# Patient Record
Sex: MALE | Race: WHITE | Employment: OTHER | URBAN - METROPOLITAN AREA
[De-identification: names, ages, dates, MRNs, and addresses within clinical notes are randomized per-mention and may not be internally consistent; named-entity substitution may affect disease eponyms.]

---

## 2020-05-21 ENCOUNTER — HOSPITAL ENCOUNTER (OUTPATIENT)
Dept: PHYSICAL THERAPY | Age: 75
Setting detail: THERAPIES SERIES
Discharge: HOME | End: 2020-05-21
Payer: MEDICARE

## 2020-05-21 PROCEDURE — 97161 PT EVAL LOW COMPLEX 20 MIN: CPT

## 2020-05-21 PROCEDURE — 97140 MANUAL THERAPY 1/> REGIONS: CPT

## 2020-05-21 PROCEDURE — 97110 THERAPEUTIC EXERCISES: CPT

## 2020-05-21 PROCEDURE — 97016 VASOPNEUMATIC DEVICE THERAPY: CPT

## 2020-05-21 ASSESSMENT — PAIN DESCRIPTION - DIRECTION: RADIATING_TOWARDS: UPPER ARM

## 2020-05-21 ASSESSMENT — PAIN SCALES - GENERAL: PAINLEVEL_OUTOF10: 1

## 2020-05-21 ASSESSMENT — PAIN DESCRIPTION - ORIENTATION: ORIENTATION: RIGHT

## 2020-05-21 ASSESSMENT — PAIN DESCRIPTION - PROGRESSION: CLINICAL_PROGRESSION: NOT CHANGED

## 2020-05-21 ASSESSMENT — PAIN DESCRIPTION - LOCATION: LOCATION: ARM

## 2020-05-21 ASSESSMENT — PAIN DESCRIPTION - ONSET: ONSET: PROGRESSIVE

## 2020-05-21 ASSESSMENT — PAIN - FUNCTIONAL ASSESSMENT: PAIN_FUNCTIONAL_ASSESSMENT: PREVENTS OR INTERFERES WITH MANY ACTIVE NOT PASSIVE ACTIVITIES

## 2020-05-21 ASSESSMENT — PAIN DESCRIPTION - PAIN TYPE: TYPE: ACUTE PAIN

## 2020-05-21 ASSESSMENT — PAIN DESCRIPTION - FREQUENCY: FREQUENCY: INTERMITTENT

## 2020-05-21 ASSESSMENT — PAIN DESCRIPTION - DESCRIPTORS: DESCRIPTORS: ACHING;SHARP;DULL

## 2020-05-21 NOTE — FLOWSHEET NOTE
pain.        Plan for Next Session:  Continue joint mobs and advance ROM to karen, pt not much for home exercise so keep HEP min to add important ones. ....       Time In / Time Out:    1415/1515       Timed Code/Total Treatment Minutes:  25/60  1 Man 15', 1 TE 10'  1 Vaso 10', 1 Eval 25'      Next Progress Note due:  vsiit 10 or dc      Plan of Care Interventions:  [x] Therapeutic Exercise  [x] Modalities:  [x] Therapeutic Activity     [] Ultrasound  [] Estim  [] Gait Training      [] Cervical Traction [] Lumbar Traction  [x] Neuromuscular Re-education    [x] Cold/hotpack [] Iontophoresis   [x] Instruction in HEP      [x] Vasopneumatic   [] Dry Needling    [x] Manual Therapy               [] Aquatic Therapy              Electronically signed by:  Bell Currie, PT, MPT, ATC  5/21/2020, 3:23 PM    5/21/2020, 3:26 PM

## 2020-05-28 ENCOUNTER — HOSPITAL ENCOUNTER (OUTPATIENT)
Dept: PHYSICAL THERAPY | Age: 75
Setting detail: THERAPIES SERIES
Discharge: HOME | End: 2020-05-28
Payer: MEDICARE

## 2020-05-28 PROCEDURE — 97016 VASOPNEUMATIC DEVICE THERAPY: CPT

## 2020-05-28 PROCEDURE — 97110 THERAPEUTIC EXERCISES: CPT

## 2020-05-28 PROCEDURE — 97140 MANUAL THERAPY 1/> REGIONS: CPT

## 2020-05-28 NOTE — FLOWSHEET NOTE
Outpatient Physical Therapy  [place]           [x] Phone: [phone]   Fax: [phone]  [place]           [] Phone: [phone]   Fax: [phone]        Physical Therapy Daily Treatment Note  Date:  2020    Patient Name:  Srinivasan Miranda    :  1945  MRN: [id number]  Restrictions/Precautions: Other position/activity restrictions: none  Diagnosis:   Diagnosis: R shoulder bursitis  Date of Injury/Surgery:   Treatment Diagnosis: Treatment Diagnosis: R shoulder pain, weakness, inflammation, stiffness    Insurance/Certification information: PT Insurance Information: Jessica Choi, [address] [facility]   Referring Physician:  Referring Practitioner: Dr Michelle Tabares   Next Doctor Visit:    Plan of care signed (Y/N):  pending  Outcome Measure: DASH 7%  Visit# / total visits: -  Pain level: 0/10 at rest, 6/10 with movement  Goals:       Short term goals  Time Frame for Short term goals: 3 weeks  Short term goal 1: Pt will be able to advance ROM work w/o pain  Short term goal 2: Pt will be able to raise arm on own > 120 w/ min to no pain  Short term goal 3: Pt will compliant w/ HEP  Long term goals  Time Frame for Long term goals : 6 weeks  Long term goal 1: Pt will be able to manage his symptoms on his own after PT  Long term goal 2: Pt will be able to return to prior activity w/o R shoulder pain  Long term goal 3: Pt will have full AROM and strength R UE w/o pain  Long term goal 4: Pt will decrease DASH score to <5% disability    Summary of Evaluation: Assessment: Pt is a 71 yo male who presents w/ R shoulder pain, bursitis, tendonosis. He has no prior Hx of shoulder pain and was using shoulder/arm w/o pain prior to onset 4 months ago. No know DAWN. He demonstrates limited AROM and painful; PROM, weakness and limited joint mobility in R shoulder. These limiations are affecting his ability to perform his usual daily actiivity and he will benefit from PT to help restore PLOF w/o pain.       Subjective:  Pt after manual therapy and stretches. Pt noted mild soreness with 1/10 pain at end of session and almost had full AROM of flexion after game ready. Pt would continue to benefit from skilled therapy interventions to address remaining impairments, improve mobility and strength and progress toward goal completion while reducing risk for re-injury or further decline. Plan for Next Session:  Continue joint mobs and advance ROM to karen, pt not much for home exercise so keep HEP min to add important ones. ....     Time In / Time Out:   7441-5044    Timed Code/Total Treatment Minutes:  53',  1 Man 19', 2 TE 24'  1 Vaso 10',     Next Progress Note due:  vsiit 10 or dc    Plan of Care Interventions:  [x] Therapeutic Exercise  [x] Modalities:  [x] Therapeutic Activity     [] Ultrasound  [] Estim  [] Gait Training      [] Cervical Traction [] Lumbar Traction  [x] Neuromuscular Re-education    [x] Cold/hotpack [] Iontophoresis   [x] Instruction in HEP      [x] Vasopneumatic   [] Dry Needling    [x] Manual Therapy               [] Aquatic Therapy              Electronically signed by:  Audrey Priest PT, DPT  5/28/2020, 8:28 AM    5/28/2020, 8:28 AM

## 2020-06-01 ENCOUNTER — HOSPITAL ENCOUNTER (OUTPATIENT)
Dept: PHYSICAL THERAPY | Age: 75
Setting detail: THERAPIES SERIES
Discharge: HOME | End: 2020-06-01
Payer: MEDICARE

## 2020-06-01 PROCEDURE — 97140 MANUAL THERAPY 1/> REGIONS: CPT

## 2020-06-01 PROCEDURE — 97016 VASOPNEUMATIC DEVICE THERAPY: CPT

## 2020-06-01 PROCEDURE — 97110 THERAPEUTIC EXERCISES: CPT

## 2020-06-01 NOTE — FLOWSHEET NOTE
Outpatient Physical Therapy  [place]           [x] Phone: [phone]   Fax: [phone]  [place]           [] Phone: [phone]   Fax: [phone]        Physical Therapy Daily Treatment Note  Date:  2020    Patient Name:  Kian Urban    :  1945  MRN: [id number]  Restrictions/Precautions: Other position/activity restrictions: none  Diagnosis:   Diagnosis: R shoulder bursitis  Date of Injury/Surgery:   Treatment Diagnosis: Treatment Diagnosis: R shoulder pain, weakness, inflammation, stiffness    Insurance/Certification information: PT Insurance Information: Heike Kidd, [address] [place] [address]   Referring Physician:  Referring Practitioner: Dr Carlie Lanier   Next Doctor Visit:    Plan of care signed (Y/N):  pending  Outcome Measure: DASH 7%  Visit# / total visits: 3/8-  Pain level: 0/10 at rest, 6/10 with movement  Goals:       Short term goals  Time Frame for Short term goals: 3 weeks  Short term goal 1: Pt will be able to advance ROM work w/o pain  Partially met  Short term goal 2: Pt will be able to raise arm on own > 120 w/ min to no pain  Some progress   Short term goal 3: Pt will compliant w/ HEP  Ok so far  Long term goals  Time Frame for Long term goals : 6 weeks  Long term goal 1: Pt will be able to manage his symptoms on his own after PT  Long term goal 2: Pt will be able to return to prior activity w/o R shoulder pain  Long term goal 3: Pt will have full AROM and strength R UE w/o pain  Long term goal 4: Pt will decrease DASH score to <5% disability    Summary of Evaluation: Assessment: Pt is a 69 yo male who presents w/ R shoulder pain, bursitis, tendonosis. He has no prior Hx of shoulder pain and was using shoulder/arm w/o pain prior to onset 4 months ago. No know DAWN. He demonstrates limited AROM and painful; PROM, weakness and limited joint mobility in R shoulder.   These limiations are affecting his ability to perform his usual daily actiivity and he will benefit from PT to help restore PLOF w/o pain. Subjective:  Pt reports that he feels the shoulder/arm is getting better. He can raise it better now, but still painful to do and can still hurt at night also or just resting at times. He even had a little pain L shoulder recently. Any changes in Ambulatory Summary Sheet? None    Objective:    Prior to today's treatment session, patient was screened for signs and symptoms related to COVID-19 including but not limited to verbally answering questions related to feeling ill, cough, or SOB, along with taking temperature via forehead thermometer. Patient presented with all negative signs and symptoms and had no fever >100 degrees Fahrenheit this date. Pt having more pain w/ abduction, even scaption today, had to help keep scap depressed to get any scaption. IR/ER mostly full in scap plane PROM w/ arm on towel. Pt has UT compensations w/ elevation and has a hard time correcting.    AROM R shoulder flex after manual work:  105°, but still painful       Exercises: (No more than 4 columns)   Exercise/Equipment 5/21/20  #1 5/28/20 #2 6/1/20  #3           WARM UP --   rpm, F/B 2' ea       pulley  Flex 2 x 10  abd 2 x 10 Flex 2 x 10  abd 2 x 10   MANUAL:  As below x See below x         TABLE      Self inferior mob 30\"x2  -   Cane press up  -- 10x2   Cane flexion  2 x 10 10x2   ABC's supine, shoulder 90  - 1x   scap retractions  2 x 10 10x2   Seated abd on table  2 x 10 10x2, below 90 today                  STANDING      pendulum 5# 1'  -   Taffy pull  GTB 10x   GTB 10x2   Standing cane flexion  x10 --   doorway pec stretch  2 x 30\"    Wall slide    Doorway, L foot FW, 10x    B ext w/ scap squeeze    GTB 10x2   Ball on wall, scap stab slide   10x2                     PROPRIOCEPTION                                    MODALITIES      Vaso  10' 10' 15'             Other Therapeutic Activities/Education:  Pathology and anatomy of shoulder joint and how it relates to condition, Hep

## 2020-06-03 ENCOUNTER — HOSPITAL ENCOUNTER (OUTPATIENT)
Dept: PHYSICAL THERAPY | Age: 75
Setting detail: THERAPIES SERIES
Discharge: HOME | End: 2020-06-03
Payer: MEDICARE

## 2020-06-03 PROCEDURE — 97110 THERAPEUTIC EXERCISES: CPT

## 2020-06-03 PROCEDURE — 97016 VASOPNEUMATIC DEVICE THERAPY: CPT

## 2020-06-03 PROCEDURE — 97140 MANUAL THERAPY 1/> REGIONS: CPT

## 2020-06-03 NOTE — FLOWSHEET NOTE
importance    Home Exercise Program:  Issued, practiced and pt demo ability to perform 5/21/2020.  6/3/20: updated HEP    Manual Treatments:  Joint mobs to [facility] joint II-III posterior and inferior, AC mobs, PROM to karen R shoulder all directions    Modalities:  Vaso lo pressure to R shoulder, 15'     Communication with other providers:  POC sent 5/21/20    Assessment: Pt tolerated today's treatment without any adverse reactions or complications this date. Pt is making nice improvements w/ AROM and some pain reduction also. Pt continues w/ limited and painful shoulder movements w/ compensatory patterns. Pt would continue to benefit from skilled therapy interventions to address remaining impairments, improve mobility and strength and progress toward goal completion while reducing risk for re-injury or further decline. Pain after Rx mostly unchanged. Plan for Next Session:  Continue joint mobs and advance ROM to karen, pt not much for home exercise so keep HEP min to add important ones. ....     Time In / Time Out:   1300/1400    Timed Code/Total Treatment Minutes:  45/60     1 Man 20', 2 TE 25'  1 Vaso 15',     Next Progress Note due:  vsiit 10 or dc    Plan of Care Interventions:  [x] Therapeutic Exercise  [x] Modalities:  [x] Therapeutic Activity     [] Ultrasound  [] Estim  [] Gait Training      [] Cervical Traction [] Lumbar Traction  [x] Neuromuscular Re-education    [x] Cold/hotpack [] Iontophoresis   [x] Instruction in HEP      [x] Vasopneumatic   [] Dry Needling    [x] Manual Therapy               [] Aquatic Therapy              Electronically signed by:  Celena Primrose, PT, MPT, ATC 6/3/2020, 10:50 AM       6/3/2020, 1:48 PM

## 2020-06-08 ENCOUNTER — HOSPITAL ENCOUNTER (OUTPATIENT)
Dept: PHYSICAL THERAPY | Age: 75
Setting detail: THERAPIES SERIES
Discharge: HOME | End: 2020-06-08
Payer: MEDICARE

## 2020-06-08 PROCEDURE — 97016 VASOPNEUMATIC DEVICE THERAPY: CPT

## 2020-06-08 PROCEDURE — 97140 MANUAL THERAPY 1/> REGIONS: CPT

## 2020-06-08 PROCEDURE — 97110 THERAPEUTIC EXERCISES: CPT

## 2020-06-08 NOTE — FLOWSHEET NOTE
will benefit from PT to help restore PLOF w/o pain. Did have Cortizone Feb/March      Subjective:   Starting to have some soreness on L now, R shoulder is a little more stiff than last Rx and pain about the same maybe back up just a little bi, but just today, was ok over weekend. Any changes in Ambulatory Summary Sheet? None    Objective:    Prior to today's treatment session, patient was screened for signs and symptoms related to COVID-19 including but not limited to verbally answering questions related to feeling ill, cough, or SOB, along with taking temperature via forehead thermometer. Patient presented with all negative signs and symptoms and had no fever >100 degrees Fahrenheit this date. 6/1: AROM R shoulder flex after manual work: 105°, but still painful   PROM ER full w/ arm propped on towel no pain. Painful flex in early arc of movement so did above painful arc and ROM improved w/ reps. Pain at 90 w/ prone ext. Flex AROM is improving w/ less painful arc.         Exercises: (No more than 4 columns)   Exercise/Equipment 6/1/20  #3 6/3/20  #4 6/8/20  #5             WARM UP  rpm, F/B 2' ea   rpm, F/B 2' ea  rpm, F/B 2' ea       pulley Flex 2 x 10  abd 2 x 10 Flex 2 x 10  abd 2 x 10 Flex 2 x 10  abd 2 x 10    MANUAL:  As below x x x           TABLE       Self inferior mob - -     Cane press up 10x2 10x2 1# 10x2     Cane flexion 10x2 10x2 AROM no cane, apinfree ROM 10x2    ABC's supine, shoulder 90 1x 1x 1# 1x    S/L ER   -            scap retractions 10x2 20x -    Seated abd on table 10x2, below 90 today Stand ball roll to 90 10x2 -    Prone ext  row  -  - 10x2   -              STANDING       pendulum -      Taffy pull  GTB 10x2 GTB 10x2 GTB 10x2    Standing cane flexion -- - -    doorway pec stretch  - -    Wall slide flex  Doorway, L foot FW, 10x  Incline table 10x Incline table 10xea flex and scapt, ABC 1x also    B ext w/ scap squeeze  GTB 10x2 GTB 10x2 GTB 10x2

## 2020-06-11 ENCOUNTER — HOSPITAL ENCOUNTER (OUTPATIENT)
Dept: PHYSICAL THERAPY | Age: 75
Setting detail: THERAPIES SERIES
Discharge: HOME | End: 2020-06-11
Payer: MEDICARE

## 2020-06-11 PROCEDURE — 97016 VASOPNEUMATIC DEVICE THERAPY: CPT

## 2020-06-11 PROCEDURE — 97140 MANUAL THERAPY 1/> REGIONS: CPT

## 2020-06-11 PROCEDURE — 97110 THERAPEUTIC EXERCISES: CPT

## 2020-06-11 NOTE — FLOWSHEET NOTE
GTB 10x2 GTB 10x2   Wall saw  10x2  10x2 10x2 20x   Seated eccentric lower scaption  10x 10x    TRX flex walk outs    10x 10x2    Wall push up    10x2 10x2   Ball on wall 4 way    20xea               PROPRIOCEPTION                                          MODALITIES       Vaso  15' 15' 15' 15'              Other Therapeutic Activities/Education:    Home Exercise Program:  Issued, practiced and pt demo ability to perform 5/21/2020.  6/3/20: updated HEP    Manual Treatments:  Scap mobs, Joint mobs to [facility] joint II-III posterior and inferior, AC mobs, PROM to karen R shoulder all directions    Modalities:  Vaso lo pressure to R shoulder, 15'     Communication with other providers:  POC sent 5/21/20    Assessment: Pt tolerated today's treatment without any adverse reactions or complications this date. Pt is making nice improvements w/ AROM and some pain reduction also. Pt continues w/ limited and painful shoulder movements w/ compensatory patterns. Pt would continue to benefit from skilled therapy interventions to address remaining impairments, improve mobility and strength and progress toward goal completion while reducing risk for re-injury or further decline. Pain after Rx mostly unchanged. Plan for Next Session:  Continue joint mobs and advance ROM to karen, pt not much for home exercise so keep HEP min to add important ones. ....     Time In / Time Out:  1305/1405       Timed Code/Total Treatment Minutes:   45/60    1 Man 20', 2 TE 25'  1 Vaso 15',     Next Progress Note due:  vsiit 10 or dc    Plan of Care Interventions:  [x] Therapeutic Exercise  [x] Modalities:  [x] Therapeutic Activity     [] Ultrasound  [] Estim  [] Gait Training      [] Cervical Traction [] Lumbar Traction  [x] Neuromuscular Re-education    [x] Cold/hotpack [] Iontophoresis   [x] Instruction in HEP      [x] Vasopneumatic   [] Dry Needling    [x] Manual Therapy               [] Aquatic Therapy              Electronically signed by:  Lexi Howard

## 2020-06-16 ENCOUNTER — HOSPITAL ENCOUNTER (OUTPATIENT)
Dept: PHYSICAL THERAPY | Age: 75
Setting detail: THERAPIES SERIES
Discharge: HOME | End: 2020-06-16
Payer: MEDICARE

## 2020-06-16 PROCEDURE — 97110 THERAPEUTIC EXERCISES: CPT

## 2020-06-16 PROCEDURE — 97140 MANUAL THERAPY 1/> REGIONS: CPT

## 2020-06-16 PROCEDURE — 97016 VASOPNEUMATIC DEVICE THERAPY: CPT

## 2020-06-18 ENCOUNTER — HOSPITAL ENCOUNTER (OUTPATIENT)
Dept: PHYSICAL THERAPY | Age: 75
Setting detail: THERAPIES SERIES
Discharge: HOME | End: 2020-06-18
Payer: MEDICARE

## 2020-06-18 PROCEDURE — 97016 VASOPNEUMATIC DEVICE THERAPY: CPT

## 2020-06-18 PROCEDURE — 97112 NEUROMUSCULAR REEDUCATION: CPT

## 2020-06-18 PROCEDURE — 97140 MANUAL THERAPY 1/> REGIONS: CPT

## 2020-06-18 PROCEDURE — 97110 THERAPEUTIC EXERCISES: CPT

## 2020-06-18 NOTE — FLOWSHEET NOTE
Treatments:  Scap mobs, Joint mobs to [facility] joint II-III posterior and inferior, , PROM to karen R shoulder all directions,     Modalities:  Vaso lo pressure to R shoulder, 15'     Communication with other providers:  POC sent 5/21/20    Assessment: Hazel Schafer demonstrated improvements in passive ER this date with little to no pain at end range, even without performing subscap release this date. He is making progress with reports of decreased pain and improved movement during functional tasks. He is tolerating exercises well in the clinic without increased pain. Abduction/Scaption ROM continues to be quite limited and painful. He will continue to benefit from skilled PT services in order to progress strengthening and neuromuscular re-education of shoulder girdle as well as to improve mobility of the shoulder complex. End session pain: 1/10    Plan for Next Session:  SCHEDULE?? Continue joint mobs and advance ROM to karen, pt not much for home exercise so keep HEP min to add important ones. ....     Time In / Time Out:    1350/1453    Timed Code/Total Treatment Minutes:   48'/63' 1 vaso 13' 1 man 13' 1  NR 15' 1 TE 18'     Next Progress Note due:  vsiit 10 or dc    Plan of Care Interventions:  [x] Therapeutic Exercise  [x] Modalities:  [x] Therapeutic Activity     [] Ultrasound  [] Estim  [] Gait Training      [] Cervical Traction [] Lumbar Traction  [x] Neuromuscular Re-education    [x] Cold/hotpack [] Iontophoresis   [x] Instruction in HEP      [x] Vasopneumatic   [] Dry Needling    [x] Manual Therapy               [] Aquatic Therapy              Electronically signed by:  Nadia Sanford PTA       6/18/2020, 8:19 AM

## 2020-06-23 ENCOUNTER — HOSPITAL ENCOUNTER (OUTPATIENT)
Dept: PHYSICAL THERAPY | Age: 75
Setting detail: THERAPIES SERIES
Discharge: HOME | End: 2020-06-23
Payer: MEDICARE

## 2020-06-23 PROCEDURE — 97112 NEUROMUSCULAR REEDUCATION: CPT

## 2020-06-23 PROCEDURE — 97140 MANUAL THERAPY 1/> REGIONS: CPT

## 2020-06-23 PROCEDURE — 97016 VASOPNEUMATIC DEVICE THERAPY: CPT

## 2020-06-23 PROCEDURE — 97110 THERAPEUTIC EXERCISES: CPT

## 2020-06-23 NOTE — FLOWSHEET NOTE
appropriate desk set up and good posture while sitting. Home Exercise Program:  Issued, practiced and pt demo ability to perform 5/21/2020.  6/3/20: updated HEP    Manual Treatments:  Scap mobs, Joint mobs to [facility] joint II-III posterior and inferior, , PROM to karen R shoulder all directions,     Modalities:  Vaso lo pressure to R shoulder, 15'     Communication with other providers:  POC sent 5/21/20    Assessment:  Anita Tovar tolerated today's session well. He made significant gains in abduction/scaption ROM this date without increased pain. ER is full at 45° of abduction; however, an increase of just 10° of abduction limits ER moderately secondary to pinch in the shoulder. He is able to raise his arm through more ROM during elevation; however, there is still compensatory patterns involved. He will continue to benefit from skilled PT services in order to improve joint and soft tissue mobility and restore strength and proper muscle firing patterns for improved AROM of the shoulder without pain. End session pain: 1-2/10    Plan for Next Session:    Continue joint mobs and advance ROM to karen, pt not much for home exercise so keep HEP min to add important ones. ....     Time In / Time Out:    1430/1529    Timed Code/Total Treatment Minutes:   44'/59' 1 vaso 13' 1 man 15' 1 NR 15' 1 TE 14'     Next Progress Note due:  vsiit 10 or dc    Plan of Care Interventions:  [x] Therapeutic Exercise  [x] Modalities:  [x] Therapeutic Activity     [] Ultrasound  [] Estim  [] Gait Training      [] Cervical Traction [] Lumbar Traction  [x] Neuromuscular Re-education    [x] Cold/hotpack [] Iontophoresis   [x] Instruction in HEP      [x] Vasopneumatic   [] Dry Needling    [x] Manual Therapy               [] Aquatic Therapy              Electronically signed by:  Malka Narvaez, PTA       6/23/2020, 8:16 AM

## 2020-06-25 ENCOUNTER — HOSPITAL ENCOUNTER (OUTPATIENT)
Dept: PHYSICAL THERAPY | Age: 75
Setting detail: THERAPIES SERIES
Discharge: HOME | End: 2020-06-25
Payer: MEDICARE

## 2020-06-25 PROCEDURE — 97140 MANUAL THERAPY 1/> REGIONS: CPT

## 2020-06-25 PROCEDURE — 97112 NEUROMUSCULAR REEDUCATION: CPT

## 2020-06-25 PROCEDURE — 97016 VASOPNEUMATIC DEVICE THERAPY: CPT

## 2020-06-25 PROCEDURE — 97110 THERAPEUTIC EXERCISES: CPT

## 2020-06-25 NOTE — FLOWSHEET NOTE
Outpatient Physical Therapy  [place]           [x] Phone: [phone]   Fax: [phone]  [place]           [] Phone: [phone]   Fax: [phone]        Physical Therapy Daily Treatment Note  Date:  2020    Patient Name:  Garret Mo    :  1945  MRN: [id number]  Restrictions/Precautions: Other position/activity restrictions: none  Diagnosis:   Diagnosis: R shoulder bursitis  Date of Injury/Surgery:   Treatment Diagnosis: Treatment Diagnosis: R shoulder pain, weakness, inflammation, stiffness    Insurance/Certification information: PT Insurance Information: Niko Vega, [address]   Referring Physician:  Referring Practitioner: Dr Rosy Ha   Next Doctor Visit:  Needs to r/s   Plan of care signed (Y/N):  Pending, re-faxed 6/3/20 - re-faxed   Outcome Measure: DASH 7%, : 4.5%  Visit# / total visits:    10/12, pending PN for more Rx  Pain level: 0/10 at rest, 2-3/10 with movement      Goals:       Short term goals  Time Frame for Short term goals: 3 weeks  Short term goal 1: Pt will be able to advance ROM work w/o pain  Partially met  Short term goal 2: Pt will be able to raise arm on own > 120 w/ min to no pain  Met   Short term goal 3: Pt will compliant w/ HEP  Ok so far  Long term goals  Time Frame for Long term goals : 6 weeks  Long term goal 1: Pt will be able to manage his symptoms on his own after PT  Progressing   Long term goal 2: Pt will be able to return to prior activity w/o R shoulder pain  Progressing   Long term goal 3: Pt will have full AROM and strength R UE w/o pain  Progressing    Long term goal 4: Pt will decrease DASH score to <5% disability   Progressing    DASH 7%, : 4.5%    Summary of Evaluation: Assessment: Pt is a 69 yo male who presents w/ R shoulder pain, bursitis, tendonosis. He has no prior Hx of shoulder pain and was using shoulder/arm w/o pain prior to onset 4 months ago. No know DAWN.   He demonstrates limited AROM and painful; PROM, weakness and

## 2020-06-25 NOTE — PROGRESS NOTES
Assessment:   Anita Tovar tolerated today's session well. He made significant gains in abduction/scaption ROM this date without increased pain. ER is full at 45° of abduction; however, an increase of just 10° of abduction limits ER moderately secondary to pinch in the shoulder. He is able to raise his arm through more ROM during elevation; however, there is still compensatory patterns involved. He will continue to benefit from skilled PT services in order to improve joint and soft tissue mobility and restore strength and proper muscle firing patterns for improved AROM of the shoulder without pain.  End session pain: 1-2/10    Goal Status:  [] Achieved [x] Partially Achieved  [] Not Achieved   Short term goals  Time Frame for Short term goals: 3 weeks  Short term goal 1: Pt will be able to advance ROM work w/o pain  Partially met  Short term goal 2: Pt will be able to raise arm on own > 120 w/ min to no pain  Met   Short term goal 3: Pt will compliant w/ HEP  Ok so far  Long term goals  Time Frame for Long term goals : 6 weeks  Long term goal 1: Pt will be able to manage his symptoms on his own after PT  Progressing   Long term goal 2: Pt will be able to return to prior activity w/o R shoulder pain  Progressing   Long term goal 3: Pt will have full AROM and strength R UE w/o pain  Progressing    Long term goal 4: Pt will decrease DASH score to <5% disability   Progressing    DASH 7%, 6/25: 4.5%    Frequency/Duration:  # Days per week: [] 1 day # Weeks: [] 1 week [] 4 weeks [] 8 weeks     [x] 2 days   [] 2 weeks [] 5 weeks [] 10 weeks     [] 3 days   [] 3 weeks [x] 6 weeks [] 12 weeks       Rehab Potential: [] Excellent [x] Good [] Fair  [] Poor         Patient Status: [x] Continue per initial plan of Care     [] Patient now discharged     [x] Additional visits requested, Please re-certify for additional visits:      Requested frequency/duration:  2/week for 3-4 weeks    If we are requesting more visits, we fully anticipate the patient's condition is expected to improve within the treatment timeframe we are requesting. Electronically signed by:  Rian Sky PT, MPT, ATC  6/25/2020, 9:47 AM    6/25/2020, 2:25 PM  If you have any questions or concerns, please don't hesitate to call.   Thank you for your referral.    Physician Signature:______________________ Date:______ Time: ________  By signing above, therapists plan is approved by physician

## 2020-06-30 ENCOUNTER — HOSPITAL ENCOUNTER (OUTPATIENT)
Dept: PHYSICAL THERAPY | Age: 75
Setting detail: THERAPIES SERIES
Discharge: HOME | End: 2020-06-30
Payer: MEDICARE

## 2020-06-30 PROCEDURE — 97016 VASOPNEUMATIC DEVICE THERAPY: CPT

## 2020-06-30 PROCEDURE — 97140 MANUAL THERAPY 1/> REGIONS: CPT

## 2020-06-30 PROCEDURE — 97110 THERAPEUTIC EXERCISES: CPT

## 2020-06-30 PROCEDURE — 97112 NEUROMUSCULAR REEDUCATION: CPT

## 2020-06-30 NOTE — FLOWSHEET NOTE
Outpatient Physical Therapy  [place]           [x] Phone: [phone]   Fax: [phone]  Fahadhirasherin Manoj           [] Phone: [phone]   Fax: [phone]        Physical Therapy Daily Treatment Note  Date:  2020    Patient Name:  Roberto Quiroga    :  1945  MRN: [id number]  Restrictions/Precautions: Other position/activity restrictions: none  Diagnosis:   Diagnosis: R shoulder bursitis  Date of Injury/Surgery:   Treatment Diagnosis: Treatment Diagnosis: R shoulder pain, weakness, inflammation, stiffness    Insurance/Certification information: PT Insurance Information: Alex Quigley, [address]   Referring Physician:  Referring Practitioner: Dr Vero Hoyos   Next Doctor Visit:  Needs to r/s   Plan of care signed (Y/N):  Y  Outcome Measure: DASH 7%, : 4.5%  Visit# / total visits:    , pending PN for more Rx  Pain level: 1-2/10 with elevation       Goals:       Short term goals  Time Frame for Short term goals: 3 weeks  Short term goal 1: Pt will be able to advance ROM work w/o pain  Partially met  Short term goal 2: Pt will be able to raise arm on own > 120 w/ min to no pain  Met   Short term goal 3: Pt will compliant w/ HEP  Ok so far  Long term goals  Time Frame for Long term goals : 6 weeks  Long term goal 1: Pt will be able to manage his symptoms on his own after PT  Progressing   Long term goal 2: Pt will be able to return to prior activity w/o R shoulder pain  Progressing   Long term goal 3: Pt will have full AROM and strength R UE w/o pain  Progressing    Long term goal 4: Pt will decrease DASH score to <5% disability   Progressing    DASH 7%, : 4.5%    Summary of Evaluation: Assessment: Pt is a 71 yo male who presents w/ R shoulder pain, bursitis, tendonosis. He has no prior Hx of shoulder pain and was using shoulder/arm w/o pain prior to onset 4 months ago. No know DAWN. He demonstrates limited AROM and painful; PROM, weakness and limited joint mobility in R shoulder.   These limiations are affecting his ability to perform his usual daily actiivity and he will benefit from PT to help restore PLOF w/o pain. Did have Cortizone Feb/March      Subjective:    Min Lemus arrives to therapy stating that the shoulder is improving, he is able to raise his arm up better without having to pull it into his side to protect himself      Any changes in Ambulatory Summary Sheet? None    Objective:    Prior to today's treatment session, patient was screened for signs and symptoms related to COVID-19 including but not limited to verbally answering questions related to feeling ill, cough, or SOB, along with taking temperature via forehead thermometer. Patient presented with all negative signs and symptoms and had no fever >100 degrees Fahrenheit this date. Decreased pain and improved ER ROM with arm abducted to 55° when inferior glide was given to [facility] joint. Painful to raise arm up while in supine when raising it from the table, had to modify and lift from ~ 45° from table to [place]. Ayesha with S/L ER. Exercises: (No more than 4 columns)   Exercise/Equipment 6/23/2020 #9 6/25/20  310 6/30/2020 #11           WARM UP UBE 2'/2' @ 110 UBE 2'/2' @ 110 Nu-step arms mostly 5''    pulley            MANUAL:  As below x x x         TABLE      Cane press up  2# 10x2  2# 2*10   Cane flexion  AROM flex 10x2, focus on mechnanics AROM flex 2*10   ABC's supine, shoulder 90  2# 1x 2# 1*   S/L ER (towel) 1# 2*10 1# 10x2 1# 2*10   Prone ext  row -- 10x2  10x2  2*10  2*10            STANDING      Taffy pull  GTB 15*  GTB 15x  --   B ext w/ scap squeeze  BTB w/ER 2*10 BTB w/ER 2x10 BTB w/ER 2*10   Wall push up  15* 15x 15*   Ball on wall 4 way 20* ea 20xea  2# 10* ea              PROPRIOCEPTION                                    MODALITIES      Vaso   x              Other Therapeutic Activities/Education:   Education provided to patient on posture and the role it plays in our shoulder mechanics and impingement.  Gave handout with information on appropriate desk set up and good posture while sitting. Home Exercise Program:  Issued, practiced and pt demo ability to perform 5/21/2020.  6/3/20: updated HEP    Manual Treatments:  PROM in all planes of motion, grade II-III inferior and posterior GH joint mobilizations, inferior mobs with shoulder abducted to 90° and with ER PROM. Modalities:  Vaso lo pressure to R shoulder, 15'     Communication with other providers:  POC sent 5/21/20    Assessment:  Denise Leon is making nice progress towards his goals with improvements in PROM/AROM with less pain. He does still have a catch in the shoulder with elevation from a completely against gravity position as well as increased pain/catching with ER with the shoulder abducted to 55°. He also remains weak in ER. He will continue to benefit from skilled PT services in order to continue progressing strengthening, stabilization activities and manual interventions to improve shoulder mechanics. End session pain: 1/10    Plan for Next Session:    Continue joint mobs and advance ROM to karen, pt not much for home exercise so keep HEP min to add important ones. .. Briana Martin Work on scap strength and mechanics w/ movement    Time In / Time Out:  1515/1608       Timed Code/Total Treatment Minutes:  38'/53' 1 man 15' 1 vaso 15' 1 TE 10' 1 NR 13'     Next Progress Note due:  vsiit 10 or dc    Plan of Care Interventions:  [x] Therapeutic Exercise  [x] Modalities:  [x] Therapeutic Activity     [] Ultrasound  [] Estim  [] Gait Training      [] Cervical Traction [] Lumbar Traction  [x] Neuromuscular Re-education    [x] Cold/hotpack [] Iontophoresis   [x] Instruction in HEP      [x] Vasopneumatic   [] Dry Needling    [x] Manual Therapy               [] Aquatic Therapy              Electronically signed by:  Cande Butler PTA         6/30/2020, 9:29 AM

## 2020-07-02 ENCOUNTER — HOSPITAL ENCOUNTER (OUTPATIENT)
Dept: PHYSICAL THERAPY | Age: 75
Setting detail: THERAPIES SERIES
Discharge: HOME | End: 2020-07-02
Payer: MEDICARE

## 2020-07-02 PROCEDURE — 97140 MANUAL THERAPY 1/> REGIONS: CPT

## 2020-07-02 PROCEDURE — 97110 THERAPEUTIC EXERCISES: CPT

## 2020-07-02 PROCEDURE — 97016 VASOPNEUMATIC DEVICE THERAPY: CPT

## 2020-07-02 PROCEDURE — 97112 NEUROMUSCULAR REEDUCATION: CPT

## 2020-07-02 NOTE — FLOWSHEET NOTE
Outpatient Physical Therapy  [place]           [x] Phone: [phone]   Fax: [phone]  Jayda Charles           [] Phone: [phone]   Fax: [phone]        Physical Therapy Daily Treatment Note  Date:  2020    Patient Name:  Dilshad Anderson    :  1945  MRN: [id number]  Restrictions/Precautions: Other position/activity restrictions: none  Diagnosis:   Diagnosis: R shoulder bursitis  Date of Injury/Surgery:   Treatment Diagnosis: Treatment Diagnosis: R shoulder pain, weakness, inflammation, stiffness    Insurance/Certification information: PT Insurance Information: Georgina Gutierres, [address]   Referring Physician:  Referring Practitioner: Dr Berna Weiner   Next Doctor Visit:  Needs to r/s   Plan of care signed (Y/N):  Y  Outcome Measure: DASH 7%, : 4.5%  Visit# / total visits:    , pending PN for more Rx  Pain level: 1-2/10 with elevation       Goals:       Short term goals  Time Frame for Short term goals: 3 weeks  Short term goal 1: Pt will be able to advance ROM work w/o pain  Partially met  Short term goal 2: Pt will be able to raise arm on own > 120 w/ min to no pain  Met   Short term goal 3: Pt will compliant w/ HEP  Ok so far  Long term goals  Time Frame for Long term goals : 6 weeks  Long term goal 1: Pt will be able to manage his symptoms on his own after PT  Progressing   Long term goal 2: Pt will be able to return to prior activity w/o R shoulder pain  Progressing   Long term goal 3: Pt will have full AROM and strength R UE w/o pain  Progressing    Long term goal 4: Pt will decrease DASH score to <5% disability   Progressing    DASH 7%, : 4.5%    Summary of Evaluation: Assessment: Pt is a 69 yo male who presents w/ R shoulder pain, bursitis, tendonosis. He has no prior Hx of shoulder pain and was using shoulder/arm w/o pain prior to onset 4 months ago. No know DAWN. He demonstrates limited AROM and painful; PROM, weakness and limited joint mobility in R shoulder.   These wall 4 way 20xea  2# 10* ea 2# 10* ea   High rows   BlkTB 2*10   Scap depression    BlkTB 2*10              PROPRIOCEPTION                                    MODALITIES      Vaso  x  x             Other Therapeutic Activities/Education:   Education provided to patient on posture and the role it plays in our shoulder mechanics and impingement. Gave handout with information on appropriate desk set up and good posture while sitting. Home Exercise Program:  Issued, practiced and pt demo ability to perform 5/21/2020.  6/3/20: updated HEP    Manual Treatments:  PROM in all planes of motion, grade II-III inferior and posterior GH joint mobilizations, inferior mobs with shoulder abducted to 90° and with ER PROM. Modalities:  Vaso lo pressure to R shoulder, 15'     Communication with other providers:  POC sent 5/21/20    Assessment: [facility] continues to make slow but consistent progress towards his goals with improvements in his ability to raise his arm in a standing position with less pain and improved mechanics. He does; however, still have pain with certain movements and has pain when raising his arm from a position where it is totally against gravity. He will continue to benefit from shoulder stabilization and RTC strengthening exercises as well as joint mobilizations and passive stretching. End session pain: 1-2/10    Plan for Next Session:    Continue joint mobs and advance ROM to karen, pt not much for home exercise so keep HEP min to add important ones. .. Manuel Melgar Work on scap strength and mechanics w/ movement    Time In / Time Out:   1515/1615    Timed Code/Total Treatment Minutes:  45'/60' 1 vaso 13' 1 man 15' 1 TE 15' 1 NR 15'    Next Progress Note due:  vsiit 10 or dc    Plan of Care Interventions:  [x] Therapeutic Exercise  [x] Modalities:  [x] Therapeutic Activity     [] Ultrasound  [] Estim  [] Gait Training      [] Cervical Traction [] Lumbar Traction  [x] Neuromuscular Re-education    [x] Cold/hotpack [] Iontophoresis   [x] Instruction in HEP      [x] Vasopneumatic   [] Dry Needling    [x] Manual Therapy               [] Aquatic Therapy              Electronically signed by:  Cari Carlson PTA         7/2/2020, 8:39 AM

## 2020-07-08 ENCOUNTER — HOSPITAL ENCOUNTER (OUTPATIENT)
Dept: PHYSICAL THERAPY | Age: 75
Setting detail: THERAPIES SERIES
Discharge: HOME | End: 2020-07-08
Payer: MEDICARE

## 2020-07-08 PROCEDURE — 97016 VASOPNEUMATIC DEVICE THERAPY: CPT

## 2020-07-08 PROCEDURE — 97112 NEUROMUSCULAR REEDUCATION: CPT

## 2020-07-08 PROCEDURE — 97110 THERAPEUTIC EXERCISES: CPT

## 2020-07-08 PROCEDURE — 97140 MANUAL THERAPY 1/> REGIONS: CPT

## 2020-07-08 NOTE — FLOWSHEET NOTE
limiations are affecting his ability to perform his usual daily actiivity and he will benefit from PT to help restore PLOF w/o pain. Did have Cortizone Feb/March      Subjective:    Darrall Nails arrives to therapy stating that he has hit a plateau in his progress. He made a lot of progress quickly within the first few sessions of PT but now the progress has slowed. Can tell he is getting more motion and his pain levels have decreased a good bit. Any changes in Ambulatory Summary Sheet? None    Objective:  Prior to today's treatment session, patient was screened for signs and symptoms related to COVID-19 including but not limited to verbally answering questions related to feeling ill, cough, or SOB, along with taking temperature via forehead thermometer. Patient presented with all negative signs and symptoms and had no fever >100 degrees Fahrenheit this date. Painful when in resting position in prone with arm hanging off side of table, decreases with manual shoulder depression. Weakness with S/L abduction but no pain.      Exercises: (No more than 4 columns)   Exercise/Equipment 6/30/2020 #11 7/2/2020 #12 7/8/2020 #13           WARM UP Nu-step arms mostly 5''  UBE 2'/2' UBE 2'/2'    pulley            MANUAL:  As below x x x         TABLE      Cane press up 2# 2*10 2# 2*10 2# 2*10   Cane flexion AROM flex 2*10 AROM flex modified range 2*10 --   ABC's supine, shoulder 90 2# 1* 2# 1* 2# 1*   S/L ER (towel) 1# 2*10 1# 2*10 1# 2*10   S/L abduction    2*10   S/L flexion    2*10   Prone ext  row 2*10  2*10 1# 2*10  1# 2*10 1# 2*10 row, HA, extension    RS @ varying angles    30\" ea (90,120,60,40)            STANDING      Taffy pull  -- GTB 15* GTB 2*10   B ext w/ scap squeeze  BTB w/ER 2*10 BlkTB w/ER 2*10 BlkTB 2*10   Wall push up  15* 2*10 2*10   Ball on wall 4 way 2# 10* ea 2# 10* ea 2# 10* ea   High rows  BlkTB 2*10 BlkTB 2*10   Scap depression   BlkTB 2*10 BlkTB 2*10              PROPRIOCEPTION MODALITIES      Vaso   x              Other Therapeutic Activities/Education:   Education provided to patient on posture and the role it plays in our shoulder mechanics and impingement. Gave handout with information on appropriate desk set up and good posture while sitting. Home Exercise Program:  Issued, practiced and pt demo ability to perform 5/21/2020.  6/3/20: updated HEP    Manual Treatments:  PROM in all planes of motion, grade II-III inferior and posterior GH joint mobilizations, inferior mobs with shoulder abducted to 90° and with ER PROM. Modalities:  Vaso lo pressure to R shoulder, 15'     Communication with other providers:  POC sent 5/21/20    Assessment: Vesta Kunz continues to struggle with pure abduction and ER with shoulder abducted 60° and above. He was pretty weak with S/L abduction, likely partially due to tightness end range. End session pain: 1/10    Plan for Next Session:    Continue joint mobs and advance ROM to karen, pt not much for home exercise so keep HEP min to add important ones. .. Conchetta Peaks Conchetta Peaks Work on scap strength and mechanics w/ movement    Time In / Time Out:   6035/8560    Timed Code/Total Treatment Minutes:  42'/57' 1 vaso 15' 1 man 15' 1 TE 12' 1 NR 15'    Next Progress Note due:  vsiit 10 or dc    Plan of Care Interventions:  [x] Therapeutic Exercise  [x] Modalities:  [x] Therapeutic Activity     [] Ultrasound  [] Estim  [] Gait Training      [] Cervical Traction [] Lumbar Traction  [x] Neuromuscular Re-education    [x] Cold/hotpack [] Iontophoresis   [x] Instruction in HEP      [x] Vasopneumatic   [] Dry Needling    [x] Manual Therapy               [] Aquatic Therapy              Electronically signed by:  Cari Carlson PTA         7/8/2020, 8:29 AM

## 2020-07-09 NOTE — FLOWSHEET NOTE
Outpatient Physical Therapy  [place]           [x] Phone: [phone]   Fax: [phone]  [facility]           [] Phone: [phone]   Fax: [phone]        Physical Therapy Daily Treatment Note  Date:  2020    Patient Name:  Keturah Kang    :  1945  MRN: [id number]  Restrictions/Precautions: Other position/activity restrictions: none  Diagnosis:   Diagnosis: R shoulder bursitis  Date of Injury/Surgery:   Treatment Diagnosis: Treatment Diagnosis: R shoulder pain, weakness, inflammation, stiffness    Insurance/Certification information: PT Insurance Information: Eulalio Page, [address]   Referring Physician:  Referring Practitioner: Dr Rasheeda Gan   Next Doctor Visit:  Needs to r/s   Plan of care signed (Y/N):  Y  Outcome Measure: DASH 7%, : 4.5%  Visit# / total visits:    , pending PN for more Rx  Pain level: 1-2/10 with elevation       Goals:       Short term goals  Time Frame for Short term goals: 3 weeks  Short term goal 1: Pt will be able to advance ROM work w/o pain  Partially met  Short term goal 2: Pt will be able to raise arm on own > 120 w/ min to no pain  Met   Short term goal 3: Pt will compliant w/ HEP  Ok so far  Long term goals  Time Frame for Long term goals : 6 weeks  Long term goal 1: Pt will be able to manage his symptoms on his own after PT  Progressing   Long term goal 2: Pt will be able to return to prior activity w/o R shoulder pain  Progressing   Long term goal 3: Pt will have full AROM and strength R UE w/o pain  Progressing    Long term goal 4: Pt will decrease DASH score to <5% disability   Progressing    DASH 7%, : 4.5%    Summary of Evaluation: Assessment: Pt is a 71 yo male who presents w/ R shoulder pain, bursitis, tendonosis. He has no prior Hx of shoulder pain and was using shoulder/arm w/o pain prior to onset 4 months ago. No know DAWN. He demonstrates limited AROM and painful; PROM, weakness and limited joint mobility in R shoulder.   These limiations are affecting his ability to perform his usual daily actiivity and he will benefit from PT to help restore PLOF w/o pain. Did have Cortizone Feb/March      Subjective:   Norman Vivas arrives to therapy stating that the shoulder was pretty achy last night while he was trying to sleep, this happens periodically, not every night. Any changes in Ambulatory Summary Sheet? None    Objective:  Prior to today's treatment session, patient was screened for signs and symptoms related to COVID-19 including but not limited to verbally answering questions related to feeling ill, cough, or SOB, along with taking temperature via forehead thermometer. Patient presented with all negative signs and symptoms and had no fever >100 degrees Fahrenheit this date. Improved ROM with less pain into abduction when given grade III joint distraction. Decreased pain and improved ROM into ER when stretching without the towel roll under the humerus. Good ability to manage the resistance given during D2 PNF pattern.      Exercises: (No more than 4 columns)   Exercise/Equipment 7/2/2020 #12 7/8/2020 #13 7/10/2020 #14           WARM UP UBE 2'/2' UBE 2'/2'  UBE 2'/2' @ 100   pulley            MANUAL:  As below x x x         TABLE      Cane press up 2# 2*10 2# 2*10 -   Cane flexion AROM flex modified range 2*10 -- -   ABC's supine, shoulder 90 2# 1* 2# 1* -   S/L ER (towel) 1# 2*10 1# 2*10 1# 2*12   S/L abduction   2*10 2*10   S/L flexion   2*10 2*10   Prone ext  row 1# 2*10  1# 2*10 1# 2*10 row, HA, extension  -   RS @ varying angles   30\" ea (90,120,60,40) 30\" ea    D2 PNF pattern with MR   10*            STANDING      Taffy pull  GTB 15* GTB 2*10 -   B ext w/ scap squeeze  BlkTB w/ER 2*10 BlkTB 2*10 -   Wall push up  2*10 2*10 -   Ball on wall 4 way 2# 10* ea 2# 10* ea -   High rows BlkTB 2*10 BlkTB 2*10 -   Scap depression  BlkTB 2*10 BlkTB 2*10 -   Pec stretch    Doorway 2*30\"              PROPRIOCEPTION MODALITIES      Vaso  x               Other Therapeutic Activities/Education:   Education provided to patient on posture and the role it plays in our shoulder mechanics and impingement. Gave handout with information on appropriate desk set up and good posture while sitting. Home Exercise Program:  Issued, practiced and pt demo ability to perform 5/21/2020.  6/3/20: updated HEP    Manual Treatments:  PROM in all planes of motion, grade III distraction, grade II-III inferior and posterior GH joint mobilizations     Modalities:  Vaso lo pressure to R shoulder, 15'     Communication with other providers:  POC sent 5/21/20    Assessment: Yuliana Nevarez appears to have some tightness in the anterior shoulder/chest region as well as weakness in the posterior shoulder complex which is likely a part of the cause of his pain. He seems to respond nicely to mobilizations and distraction at the [facility] joint as well as stabilization exercises with increased movement and less pain. End session pain: 1/10    Plan for Next Session:    Continue joint mobs and advance ROM to karen, pt not much for home exercise so keep HEP min to add important ones. .. Cuate Billings Work on scap strength and mechanics w/ movement    Time In / Time Out:   1430/1532     Timed Code/Total Treatment Minutes:  47'/62' 1 vaso 13' 2 man 22' 1 TE 22'    Next Progress Note due:  vsiit 10 or dc    Plan of Care Interventions:  [x] Therapeutic Exercise  [x] Modalities:  [x] Therapeutic Activity     [] Ultrasound  [] Estim  [] Gait Training      [] Cervical Traction [] Lumbar Traction  [x] Neuromuscular Re-education    [x] Cold/hotpack [] Iontophoresis   [x] Instruction in HEP      [x] Vasopneumatic   [] Dry Needling    [x] Manual Therapy               [] Aquatic Therapy              Electronically signed by:  Vega Rodriguez PTA         7/9/2020, 5:45 PM

## 2020-07-10 ENCOUNTER — HOSPITAL ENCOUNTER (OUTPATIENT)
Dept: PHYSICAL THERAPY | Age: 75
Setting detail: THERAPIES SERIES
Discharge: HOME | End: 2020-07-10
Payer: MEDICARE

## 2020-07-10 PROCEDURE — 97110 THERAPEUTIC EXERCISES: CPT

## 2020-07-10 PROCEDURE — 97016 VASOPNEUMATIC DEVICE THERAPY: CPT

## 2020-07-10 PROCEDURE — 97140 MANUAL THERAPY 1/> REGIONS: CPT

## 2020-07-14 ENCOUNTER — HOSPITAL ENCOUNTER (OUTPATIENT)
Dept: PHYSICAL THERAPY | Age: 75
Setting detail: THERAPIES SERIES
Discharge: HOME | End: 2020-07-14
Payer: MEDICARE

## 2020-07-14 PROCEDURE — 97110 THERAPEUTIC EXERCISES: CPT

## 2020-07-14 PROCEDURE — 97140 MANUAL THERAPY 1/> REGIONS: CPT

## 2020-07-14 PROCEDURE — 97016 VASOPNEUMATIC DEVICE THERAPY: CPT

## 2020-07-14 NOTE — FLOWSHEET NOTE
limiations are affecting his ability to perform his usual daily actiivity and he will benefit from PT to help restore PLOF w/o pain. Did have Cortizone Feb/March      Subjective:  Chet Galo reports 1-2/10 pain. Pt reports that pain has improved since starting therapy. Pt states that sometimes he completes his home exercises. Icing seems to help the pain at home. Pain is variable; at times pain during the night , other times no pain. Any changes in Ambulatory Summary Sheet? None    Objective:  Prior to today's treatment session, patient was screened for signs and symptoms related to COVID-19 including but not limited to verbally answering questions related to feeling ill, cough, or SOB, along with taking temperature via forehead thermometer. Patient presented with all negative signs and symptoms and had no fever >100 degrees Fahrenheit this date.      Increased pain at 150 degrees PROM abduction     Exercises: (No more than 4 columns)   Exercise/Equipment 7/2/2020 #12 7/8/2020 #13 7/10/2020 #14 7/14/2020 #15            WARM UP UBE 2'/2' UBE 2'/2'  UBE 2'/2' @ 100 UBE 2'/2' @ 100   pulley              MANUAL:  As below x x x           TABLE       Cane press up 2# 2*10 2# 2*10 - 2# 2*10   Cane flexion AROM flex modified range 2*10 -- -    ABC's supine, shoulder 90 2# 1* 2# 1* -    S/L ER (towel) 1# 2*10 1# 2*10 1# 2*12 1# 2*12   S/L abduction   2*10 2*10 2*10   S/L flexion   2*10 2*10 2*10   Prone ext  row 1# 2*10  1# 2*10 1# 2*10 row, HA, extension  -    RS @ varying angles   30\" ea (90,120,60,40) 30\" ea  30\" ea    D2 PNF pattern with MR   10* 10*             STANDING       Taffy pull  GTB 15* GTB 2*10 -    B ext w/ scap squeeze  BlkTB w/ER 2*10 BlkTB 2*10 -    Wall push up  2*10 2*10 -    Ball on wall 4 way 2# 10* ea 2# 10* ea -    High rows BlkTB 2*10 BlkTB 2*10 -    Scap depression  BlkTB 2*10 BlkTB 2*10 -    Pec stretch    Doorway 2*30\" Doorway 2*30\"               PROPRIOCEPTION MODALITIES       Vaso  x                 Other Therapeutic Activities/Education:   Education provided to patient on posture and the role it plays in our shoulder mechanics and impingement. Gave handout with information on appropriate desk set up and good posture while sitting. Home Exercise Program:  Issued, practiced and pt demo ability to perform 5/21/2020.  6/3/20: updated HEP    Manual Treatments:  PROM in all planes of motion, grade III distraction, grade II-III inferior and posterior GH joint mobilizations     Modalities:  Vaso lo pressure to R shoulder, 15'     Communication with other providers:  POC sent 5/21/20    Assessment: Elsy Ellis with muscular fatigue following shoulder stabilization and PNF with manual resistance. Patient will continue to benefit from activities to improve shoulder stability and pain free ROM. Plan for Next Session:    Continue joint mobs and advance ROM to karen, pt not much for home exercise so keep HEP min to add important ones. .. Peña Jenkins Work on scap strength and mechanics w/ movement    Time In / Time Out:   0428-2799      Timed Code/Total Treatment Minutes:  35/50 mins   Manual: 10 mins  There ex: 25 mins  Vaso: 15 mins    Next Progress Note due:  vsiit 10 or dc    Plan of Care Interventions:  [x] Therapeutic Exercise  [x] Modalities:  [x] Therapeutic Activity     [] Ultrasound  [] Estim  [] Gait Training      [] Cervical Traction [] Lumbar Traction  [x] Neuromuscular Re-education    [x] Cold/hotpack [] Iontophoresis   [x] Instruction in HEP      [x] Vasopneumatic   [] Dry Needling    [x] Manual Therapy               [] Aquatic Therapy              Electronically signed by:  Eli Dick, PT         7/14/2020, 1:47 PM

## 2020-07-16 ENCOUNTER — HOSPITAL ENCOUNTER (OUTPATIENT)
Dept: PHYSICAL THERAPY | Age: 75
Setting detail: THERAPIES SERIES
Discharge: HOME | End: 2020-07-16
Payer: MEDICARE

## 2020-07-16 PROCEDURE — 97016 VASOPNEUMATIC DEVICE THERAPY: CPT

## 2020-07-16 PROCEDURE — 97140 MANUAL THERAPY 1/> REGIONS: CPT

## 2020-07-16 PROCEDURE — 97110 THERAPEUTIC EXERCISES: CPT

## 2020-07-16 NOTE — FLOWSHEET NOTE
Outpatient Physical Therapy  [facility]           [x] Phone: [phone]   Fax: [phone]  [place] [place]           [] Phone: [phone]   Fax: [phone]        Physical Therapy Daily Treatment Note  Date:  2020    Patient Name:  Ariella Weaver    :  1945  MRN: [id number]  Restrictions/Precautions: Other position/activity restrictions: none  Diagnosis:   Diagnosis: R shoulder bursitis  Date of Injury/Surgery:   Treatment Diagnosis: Treatment Diagnosis: R shoulder pain, weakness, inflammation, stiffness    Insurance/Certification information: PT Insurance Information: Orly Grant, [address]   Referring Physician:  Referring Practitioner: Dr Manuel Moncada   Next Doctor Visit:  Needs to r/s   Plan of care signed (Y/N):  Y  Outcome Measure: DASH 7%, : 4.5%  Visit# / total visits:    15/20, pending PN for more Rx  Pain level: 1-2/10 with elevation       Goals:       Short term goals  Time Frame for Short term goals: 3 weeks  Short term goal 1: Pt will be able to advance ROM work w/o pain  Partially met  Short term goal 2: Pt will be able to raise arm on own > 120 w/ min to no pain  Met   Short term goal 3: Pt will compliant w/ HEP  Ok so far  Long term goals  Time Frame for Long term goals : 6 weeks  Long term goal 1: Pt will be able to manage his symptoms on his own after PT  Progressing   Long term goal 2: Pt will be able to return to prior activity w/o R shoulder pain  Progressing   Long term goal 3: Pt will have full AROM and strength R UE w/o pain  Progressing    Long term goal 4: Pt will decrease DASH score to <5% disability   Progressing    DASH 7%, : 4.5%    Summary of Evaluation: Assessment: Pt is a 69 yo male who presents w/ R shoulder pain, bursitis, tendonosis. He has no prior Hx of shoulder pain and was using shoulder/arm w/o pain prior to onset 4 months ago. No know DAWN. He demonstrates limited AROM and painful; PROM, weakness and limited joint mobility in R shoulder.   These MODALITIES        Vaso  x                   Other Therapeutic Activities/Education:     Home Exercise Program:  Issued, practiced and pt demo ability to perform 5/21/2020.  6/3/20: updated HEP    Manual Treatments:  PROM in all planes of motion, grade III distraction, grade II-III inferior and posterior GH joint mobilizations     Modalities:  Vaso lo pressure to R shoulder, 15'     Communication with other providers:  POC sent 5/21/20    Assessment: Yuliana Nevarez with muscular fatigue following shoulder PNF with manual resistance. Patient will continue to benefit from activities to improve shoulder stability and pain free ROM. Plan for Next Session:    Continue joint mobs and advance ROM to karen, pt not much for home exercise so keep HEP min to add important ones. .. Cuate Billings Work on scap strength and mechanics w/ movement    Time In / Time Out:   2925-8002      Timed Code/Total Treatment Minutes:  40/55 mins   Manual: 15 mins   There ex: 25 mins  Vaso: 15 mins     Next Progress Note due:  vsiit 10 or dc    Plan of Care Interventions:  [x] Therapeutic Exercise  [x] Modalities:  [x] Therapeutic Activity     [] Ultrasound  [] Estim  [] Gait Training      [] Cervical Traction [] Lumbar Traction  [x] Neuromuscular Re-education    [x] Cold/hotpack [] Iontophoresis   [x] Instruction in HEP      [x] Vasopneumatic   [] Dry Needling    [x] Manual Therapy               [] Aquatic Therapy              Electronically signed by:  Annia Vazquez, PT         7/16/2020, 1:05 PM

## 2020-07-21 ENCOUNTER — HOSPITAL ENCOUNTER (OUTPATIENT)
Dept: PHYSICAL THERAPY | Age: 75
Setting detail: THERAPIES SERIES
Discharge: HOME | End: 2020-07-21
Payer: MEDICARE

## 2020-07-21 PROCEDURE — 97016 VASOPNEUMATIC DEVICE THERAPY: CPT

## 2020-07-21 PROCEDURE — 97112 NEUROMUSCULAR REEDUCATION: CPT

## 2020-07-21 PROCEDURE — 97140 MANUAL THERAPY 1/> REGIONS: CPT

## 2020-07-21 PROCEDURE — 97110 THERAPEUTIC EXERCISES: CPT

## 2020-07-21 NOTE — FLOWSHEET NOTE
Outpatient Physical Therapy  [facility]           [x] Phone: [phone]   Fax: [phone]  [facility]varado           [] Phone: [phone]   Fax: [phone]        Physical Therapy Daily Treatment Note  Date:  2020    Patient Name:  Jeffery Taylor    :  1945  MRN: [id number]  Restrictions/Precautions: Other position/activity restrictions: none  Diagnosis:   Diagnosis: R shoulder bursitis  Date of Injury/Surgery:   Treatment Diagnosis: Treatment Diagnosis: R shoulder pain, weakness, inflammation, stiffness    Insurance/Certification information: PT Insurance Information: Naila Vyas $[id number]   Referring Physician:  Referring Practitioner: Dr Darvin Farrell   Next Doctor Visit:  Needs to r/s   Plan of care signed (Y/N):  Y  Outcome Measure: DASH 7%, : 4.5%  Visit# / total visits:    , pending PN for more Rx  Pain level: 1-2/10 with elevation       Goals:       Short term goals  Time Frame for Short term goals: 3 weeks  Short term goal 1: Pt will be able to advance ROM work w/o pain  Partially met  Short term goal 2: Pt will be able to raise arm on own > 120 w/ min to no pain  Met   Short term goal 3: Pt will compliant w/ HEP  Ok so far  Long term goals  Time Frame for Long term goals : 6 weeks  Long term goal 1: Pt will be able to manage his symptoms on his own after PT  Progressing   Long term goal 2: Pt will be able to return to prior activity w/o R shoulder pain  Progressing   Long term goal 3: Pt will have full AROM and strength R UE w/o pain  Progressing    Long term goal 4: Pt will decrease DASH score to <5% disability   Progressing    DASH 7%, : 4.5%    Summary of Evaluation: Assessment: Pt is a 69 yo male who presents w/ R shoulder pain, bursitis, tendonosis. He has no prior Hx of shoulder pain and was using shoulder/arm w/o pain prior to onset 4 months ago. No know DAWN. He demonstrates limited AROM and painful; PROM, weakness and limited joint mobility in R shoulder. These limiations are affecting his ability to perform his usual daily actiivity and he will benefit from PT to help restore PLOF w/o pain. Did have Cortizone Feb/March      Subjective:  Elsy Ellis arrives to therapy stating that the shoulder is doing better, feels like he has hit another bout of improvement, pain is very minimal. Still not feeling as smooth as the opposite side but overall improving. Any changes in Ambulatory Summary Sheet? None    Objective:  Prior to today's treatment session, patient was screened for signs and symptoms related to COVID-19 including but not limited to verbally answering questions related to feeling ill, cough, or SOB, along with taking temperature via forehead thermometer. Patient presented with all negative signs and symptoms and had no fever >100 degrees Fahrenheit this date. Increased pain in shoulder/arm with attempt to raise arm from table in supine position, can only get to 60° before pain kicks in.     Exercises: (No more than 4 columns)   Exercise/Equipment 7/14/2020 #15 7/16/2020 #16 7/21/2020 #17           WARM UP UBE 2'/2' @ 100 UBE 3'/3 Nu-step UE mostly 5' L5         MANUAL:  As below            TABLE      Cane press up 2# 2*10 2# 2*10 2# 2*10   S/L ER (towel) 1# 2*12 1# 2*10 1# 2*12   S/L abduction  2*10 2*10 2*10   S/L flexion  2*10 2*10 2*10   Prone ext  row   --   RS @ varying angles  30\" ea  30\" ea 30\" ea    D2 PNF pattern with MR 10* 10* 10* ea            STANDING      Taffy pull    GTB 2*10   B ext w/ scap squeeze       Wall push up       Ball on wall 4 way      High rows      Scap depression       Pec stretch  Doorway 2*30\" Doorway 2*30\"               PROPRIOCEPTION                                    MODALITIES      Vaso                 Other Therapeutic Activities/Education:     Home Exercise Program:  Issued, practiced and pt demo ability to perform 5/21/2020.  6/3/20: updated HEP    Manual Treatments:  PROM in all planes of motion, grade III distraction, grade II-III inferior and posterior GH joint mobilizations     Modalities:  Vaso lo pressure to R shoulder, 15'     Communication with other providers:  POC sent 5/21/20    Assessment: Hamer Trenton continues to make slow but consistent progress with reports of decreased pain and improved ability to tolerate exercises in the clinic. His joint mobility is improving as well. He does still have difficulty with pain in the shoulder with elevation beginning in the fully AG position with the scapula stabilized. End session pain: 0-1/10    Plan for Next Session:    Continue joint mobs and advance ROM to karen, pt not much for home exercise so keep HEP min to add important ones. .. Esther Whitlock Work on scap strength and mechanics w/ movement    Time In / Time Out:   1430/1530    Timed Code/Total Treatment Minutes:  45'/60' 1 vaso 13' 1 man 15' 1 NR 10' 1 TE 20'    Next Progress Note due:  vsiit 10 or dc    Plan of Care Interventions:  [x] Therapeutic Exercise  [x] Modalities:  [x] Therapeutic Activity     [] Ultrasound  [] Estim  [] Gait Training      [] Cervical Traction [] Lumbar Traction  [x] Neuromuscular Re-education    [x] Cold/hotpack [] Iontophoresis   [x] Instruction in HEP      [x] Vasopneumatic   [] Dry Needling    [x] Manual Therapy               [] Aquatic Therapy              Electronically signed by:  Cara Petit PTA             7/21/2020, 8:23 AM

## 2020-07-23 ENCOUNTER — HOSPITAL ENCOUNTER (OUTPATIENT)
Dept: PHYSICAL THERAPY | Age: 75
Setting detail: THERAPIES SERIES
Discharge: HOME | End: 2020-07-23
Payer: MEDICARE

## 2020-07-23 PROCEDURE — 97110 THERAPEUTIC EXERCISES: CPT

## 2020-07-23 PROCEDURE — 97112 NEUROMUSCULAR REEDUCATION: CPT

## 2020-07-23 PROCEDURE — 97016 VASOPNEUMATIC DEVICE THERAPY: CPT

## 2020-07-23 PROCEDURE — 97140 MANUAL THERAPY 1/> REGIONS: CPT

## 2020-07-23 NOTE — PROGRESS NOTES
continues to make slow but consistent progress with reports of decreased pain and improved ability to tolerate exercises in the clinic. His joint mobility is improving as well. He does still have difficulty with pain in the shoulder with elevation and + impingement signs. End session pain: 0-1/10  He will continue to benefit from PT to help restore function w/o pain but also from follow up w/ doctor so we make sure we are not missing anything. Goal Status:  [] Achieved [x] Partially Achieved  [] Not Achieved   Short term goals  Time Frame for Short term goals: 3 weeks  Short term goal 1: Pt will be able to advance ROM work w/o pain  Mostly met  Short term goal 2: Pt will be able to raise arm on own > 120 w/ min to no pain  Met   Short term goal 3: Pt will compliant w/ HEP  Met to date   Long term goals  Time Frame for Long term goals : 6 weeks  Long term goal 1: Pt will be able to manage his symptoms on his own after PT  Partially met  Long term goal 2: Pt will be able to return to prior activity w/o R shoulder pain  7/23/2020 pt reports 75-80% there   Long term goal 3: Pt will have full AROM and strength R UE w/o pain  Progressing    Long term goal 4: Pt will decrease DASH score to <5% disability   Progressing    DASH 7%, 6/25: 4.5%, 7/23: 2%      Frequency/Duration:  # Days per week: [] 1 day # Weeks: [] 1 week [] 4 weeks [] 8 weeks     [x] 2 days   [] 2 weeks [] 5 weeks [] 10 weeks     [] 3 days   [] 3 weeks [x] 6 weeks [] 12 weeks       Rehab Potential: [] Excellent [x] Good [] Fair  [] Poor         Patient Status: [x] Continue per initial plan of Care     [] Patient now discharged     [x] Additional visits requested, Please re-certify for additional visits:      Requested frequency/duration:  2/week for 3-4 weeks    If we are requesting more visits, we fully anticipate the patient's condition is expected to improve within the treatment timeframe we are requesting.     Electronically signed by:  Tova Perez Vitaliy Doran, PT, MPT, ATC  7/23/2020, 9:34 AM    7/23/2020, 6:52 PM    If you have any questions or concerns, please don't hesitate to call.   Thank you for your referral.    Physician Signature:______________________ Date:______ Time: ________  By signing above, therapists plan is approved by physician

## 2020-07-23 NOTE — FLOWSHEET NOTE
demonstrates limited AROM and painful; PROM, weakness and limited joint mobility in R shoulder. These limiations are affecting his ability to perform his usual daily actiivity and he will benefit from PT to help restore PLOF w/o pain. Did have Cortizone Feb/March      Subjective:    Pain low currently 1/10 and max is 2-3/10. Still has trouble lifting arm just at certain angles. Any changes in Ambulatory Summary Sheet? None    Objective:  Prior to today's treatment session, patient was screened for signs and symptoms related to COVID-19 including but not limited to verbally answering questions related to feeling ill, cough, or SOB, along with taking temperature via forehead thermometer. Patient presented with all negative signs and symptoms and had no fever >100 degrees Fahrenheit this date. AROM standing (PROM supine)  Flex 130° (155°)  Abduction 140° but on second try, first time stuck at 110°.  (165°)  ER (full in scap plane) and IR equal to L. Pt still has some UT compensation w/ elevation activity as well  +Luis Alberto Underwoodta Ruthann and Neer still   MMT is nearly equal to L now, but still mild flex and abd deficits. Pt gets catching in abd in ER at 80.      Fatigue noted w/ S/L abd/scapt w/ wt      Exercises: (No more than 4 columns)   Exercise/Equipment 7/14/2020 #15 7/16/2020 #16 7/21/2020 #17 7/23/20  #18            WARM UP UBE 2'/2' @ 100 UBE 3'/3 Nu-step UE mostly 5' L5  rpm 2'/2'          MANUAL:  As below              TABLE       Cane press up 2# 2*10 2# 2*10 2# 2*10 2# 10x2    S/L ER (towel) 1# 2*12 1# 2*10 1# 2*12 1# 10x3    S/L abduction  2*10 2*10 2*10 1# slightly ant, 10x2   S/L flexion  2*10 2*10 2*10 2# 10x2, modified ROM, towel under to start at 20-30°   Prone ext  row   --    RS @ varying angles  30\" ea  30\" ea 30\" ea  --   D2 PNF pattern with MR 10* 10* 10* ea --             STANDING       Taffy pull    GTB 2*10    B ext w/ scap squeeze        Wall push up     -   Ball on wall 4 way    20xea    High rows    -   Scap depression     -   Pec stretch  Doorway 2*30\" Doorway 2*30\"  -   B abduction like jump tha arm    10x            -   PROPRIOCEPTION                                          MODALITIES       Vaso     15'              Other Therapeutic Activities/Education:     Home Exercise Program:  Issued, practiced and pt demo ability to perform 5/21/2020.  6/3/20: updated HEP    Manual Treatments:  Scap mobs, PROM in all planes of motion, grade III distraction, grade II-III inferior and posterior GH joint mobilizations     Modalities:  Vaso lo pressure to R shoulder, 15'     Communication with other providers:  POC sent 5/21/20, See PN 6/25, 7/23    Assessment: Willie Koch continues to make slow but consistent progress with reports of decreased pain and improved ability to tolerate exercises in the clinic. His joint mobility is improving as well. He does still have difficulty with pain in the shoulder with elevation and + impingement signs. End session pain: 0-1/10  He will continue to benefit from PT to help restore function w/o pain but also from follow up w/ doctor so we make sure we are not missing anything. Plan for Next Session:  UPDATE HEP--few main ones so he'll comply and express importance of this to address issue  Continue joint mobs and advance ROM to karen, pt not much for home exercise so keep HEP min to add important ones. .. Denia Hardy Work on scap strength and mechanics w/ movement    Time In / Time Out:  1350/1445      Timed Code/Total Treatment Minutes:  42/54     1 man 15' 1 NR 8' 1 TE 13'     1 vaso 15'    Next Progress Note due:  See PN 6/25, 7/23    Plan of Care Interventions:  [x] Therapeutic Exercise  [x] Modalities:  [x] Therapeutic Activity     [] Ultrasound  [] Estim  [] Gait Training      [] Cervical Traction [] Lumbar Traction  [x] Neuromuscular Re-education    [x] Cold/hotpack [] Iontophoresis   [x] Instruction in HEP      [x] Vasopneumatic   [] Dry Needling    [x] Manual Therapy               [] Aquatic Therapy              Electronically signed by:  Cierra Devlin MPT, ATC      7/23/2020, 6:48 PM

## 2020-07-27 ENCOUNTER — HOSPITAL ENCOUNTER (OUTPATIENT)
Dept: PHYSICAL THERAPY | Age: 75
Setting detail: THERAPIES SERIES
Discharge: HOME | End: 2020-07-27
Payer: MEDICARE

## 2020-07-27 PROCEDURE — 97140 MANUAL THERAPY 1/> REGIONS: CPT

## 2020-07-27 PROCEDURE — 97110 THERAPEUTIC EXERCISES: CPT

## 2020-07-27 NOTE — FLOWSHEET NOTE
Outpatient Physical Therapy  [place]           [x] Phone: [phone]   Fax: [phone]  [place]           [] Phone: [phone]   Fax: [phone]        Physical Therapy Daily Treatment Note  Date:  2020    Patient Name:  Jeffery Taylor    :  1945  MRN: [id number]  Restrictions/Precautions: Other position/activity restrictions: none  Diagnosis:   Diagnosis: R shoulder bursitis  Date of Injury/Surgery:   Treatment Diagnosis: Treatment Diagnosis: R shoulder pain, weakness, inflammation, stiffness    Insurance/Certification information: PT Insurance Information: Naila Vyas $[id number]   Referring Physician:  Referring Practitioner: Dr Darvin Farrell   Next Doctor Visit:     Plan of care signed (Y/N):  Y, pending PN  Outcome Measure: DASH 7%, : 4.5%, : 2%  Visit# / total visits:    , pending another PN for more Rx again   Pain level: 1/10 with elevation       Goals:       Short term goals  Time Frame for Short term goals: 3 weeks  Short term goal 1: Pt will be able to advance ROM work w/o pain  Mostly met  Short term goal 2: Pt will be able to raise arm on own > 120 w/ min to no pain  Met   Short term goal 3: Pt will compliant w/ HEP  Met to date   Long term goals  Time Frame for Long term goals : 6 weeks  Long term goal 1: Pt will be able to manage his symptoms on his own after PT  Partially met  Long term goal 2: Pt will be able to return to prior activity w/o R shoulder pain  2020 pt reports 75-80% there   Long term goal 3: Pt will have full AROM and strength R UE w/o pain  Progressing    Long term goal 4: Pt will decrease DASH score to <5% disability   Progressing    DASH 7%, : 4.5%, : 2%     Summary of Evaluation: Assessment: Pt is a 69 yo male who presents w/ R shoulder pain, bursitis, tendonosis. He has no prior Hx of shoulder pain and was using shoulder/arm w/o pain prior to onset 4 months ago. No know DAWN.   He demonstrates limited AROM and painful; PROM, weakness and limited joint mobility in R shoulder. These limiations are affecting his ability to perform his usual daily actiivity and he will benefit from PT to help restore PLOF w/o pain. Did have Cortizone Feb/March      Subjective:   Not feeling too bad today, ok since here last.  Did get appt w/ doc this week too. Any changes in Ambulatory Summary Sheet? None    Objective:  Prior to today's treatment session, patient was screened for signs and symptoms related to COVID-19 including but not limited to verbally answering questions related to feeling ill, cough, or SOB, along with taking temperature via forehead thermometer. Patient presented with all negative signs and symptoms and had no fever >100 degrees Fahrenheit this date. Pt still has some pain w/ abduction in plane of body and ER is better w/ towel under arm. UT compensation improving. Needs cues w/ taffy pull.         Exercises: (No more than 4 columns)   Exercise/Equipment 7/21/2020 #17 7/23/20  #18 7/27/20  #19             WARM UP Nu-step UE mostly 5' L5  rpm 2'/2'  rpm 2'/2'           MANUAL:  As below   x           TABLE       Cane press up 2# 2*10 2# 10x2  2# 10x2     S/L ER (towel) 1# 2*12 1# 10x3  1# 15x2     S/L abduction  2*10 1# slightly ant, 10x2 1# slightly ant, 10x2    S/L flexion  2*10 2# 10x2, modified ROM, towel under to start at 20-30° 2# 10x2, towel to block arm from ext     Prone ext  row --  -    RS @ varying angles  30\" ea  -- -    D2 PNF pattern with MR 10* ea -- -              STANDING       Taffy pull  GTB 2*10  GTB 10x2    B ext w/ scap squeeze    GTB 10x2    Wall push up   - Counter 10x2     Ball on wall 4 way  20xea  20xea     High rows  - -    Scap depression   - -    Pec stretch   - -    B abduction like jump tha arm  10x 10x2           -     PROPRIOCEPTION                                          MODALITIES       Vaso   15' --    CP    10'        Other Therapeutic Activities/Education:     Home Exercise Program:  Issued, practiced and pt demo ability to perform 5/21/2020.  6/3/20: updated HEP    Manual Treatments:  Scap mobs, PROM in all planes of motion, grade III distraction, grade II-III inferior and posterior GH joint mobilizations     Modalities:  '         Communication with other providers:  POC sent 5/21/20, See PN 6/25, 7/23    Assessment: Collette Musa continues to make slow but consistent progress with reports of decreased pain and improved ability to tolerate exercises in the clinic. He does still have difficulty with pain in the shoulder with elevation and + impingement signs. End session pain: 0-1/10  He will continue to benefit from PT to help restore function w/o pain but also from follow up w/ doctor so we make sure we are not missing anything. Plan for Next Session:  Issue UPDATE HEP, in chart    Continue joint mobs and advance ROM to karen, pt not much for home exercise so keep HEP min to add important ones. .. Mason Prince Work on scap strength and mechanics w/ movement    Time In / Time Out:   1530/1610    Timed Code/Total Treatment Minutes:   26/35    1 man 13' 1 TE 15'     CP not billed  Next Progress Note due:  See PN 6/25, 7/23    Plan of Care Interventions:  [x] Therapeutic Exercise  [x] Modalities:  [x] Therapeutic Activity     [] Ultrasound  [] Estim  [] Gait Training      [] Cervical Traction [] Lumbar Traction  [x] Neuromuscular Re-education    [x] Cold/hotpack [] Iontophoresis   [x] Instruction in HEP      [x] Vasopneumatic   [] Dry Needling    [x] Manual Therapy               [] Aquatic Therapy              Electronically signed by:  SAMUEL Shepherd, ATC        7/27/2020, 4:00 PM

## 2020-07-29 ENCOUNTER — HOSPITAL ENCOUNTER (OUTPATIENT)
Dept: PHYSICAL THERAPY | Age: 75
Setting detail: THERAPIES SERIES
Discharge: HOME | End: 2020-07-29
Payer: MEDICARE

## 2020-07-29 PROCEDURE — 97140 MANUAL THERAPY 1/> REGIONS: CPT

## 2020-07-29 PROCEDURE — 97110 THERAPEUTIC EXERCISES: CPT

## 2020-07-29 PROCEDURE — 97112 NEUROMUSCULAR REEDUCATION: CPT

## 2020-07-29 NOTE — FLOWSHEET NOTE
Patients Progress note was received and signed. Signed POC was scanned and placed in the patients chart.     Inocencia Witt

## 2020-07-29 NOTE — FLOWSHEET NOTE
Outpatient Physical Therapy  [place]           [x] Phone: [phone]   Fax: [phone]  [place] [facility]           [] Phone: [phone]   Fax: [phone]        Physical Therapy Daily Treatment Note  Date:  2020    Patient Name:  Raenette Skiff    :  1945  MRN: [id number]  Restrictions/Precautions: Other position/activity restrictions: none  Diagnosis:   Diagnosis: R shoulder bursitis  Date of Injury/Surgery:   Treatment Diagnosis: Treatment Diagnosis: R shoulder pain, weakness, inflammation, stiffness    Insurance/Certification information: PT Insurance Information: [employer] $[id number]   Referring Physician:  Referring Practitioner: Dr Avinash Plascencia   Next Doctor Visit:     Plan of care signed (Y/N):  Y, pending PN  Outcome Measure: DASH 7%, : 4.5%, : 2%  Visit# / total visits:    , pending another PN for more Rx again   Pain level: 0-1/10 at rest, 2/10 with abduction        Goals:       Short term goals  Time Frame for Short term goals: 3 weeks  Short term goal 1: Pt will be able to advance ROM work w/o pain  Mostly met  Short term goal 2: Pt will be able to raise arm on own > 120 w/ min to no pain  Met   Short term goal 3: Pt will compliant w/ HEP  Met to date   Long term goals  Time Frame for Long term goals : 6 weeks  Long term goal 1: Pt will be able to manage his symptoms on his own after PT  Partially met  Long term goal 2: Pt will be able to return to prior activity w/o R shoulder pain  2020 pt reports 75-80% there   Long term goal 3: Pt will have full AROM and strength R UE w/o pain  Progressing    Long term goal 4: Pt will decrease DASH score to <5% disability   Progressing    DASH 7%, : 4.5%, : 2%     Summary of Evaluation: Assessment: Pt is a 69 yo male who presents w/ R shoulder pain, bursitis, tendonosis. He has no prior Hx of shoulder pain and was using shoulder/arm w/o pain prior to onset 4 months ago. No know DAWN.   He demonstrates limited AROM and painful; PROM, weakness and limited joint mobility in R shoulder. These limiations are affecting his ability to perform his usual daily actiivity and he will benefit from PT to help restore PLOF w/o pain. Did have Cortizone Feb/March      Subjective:   [place] arrives to therapy stating that he sees his doc for a follow up on Friday. He feels 90% improved since initial day of PT; however, still limited with pure abduction, painful and can't get past 90°. Any changes in Ambulatory Summary Sheet? None    Objective:  Prior to today's treatment session, patient was screened for signs and symptoms related to COVID-19 including but not limited to verbally answering questions related to feeling ill, cough, or SOB, along with taking temperature via forehead thermometer. Patient presented with all negative signs and symptoms and had no fever >100 degrees Fahrenheit this date. Active Abduction ~90° with moderate shoulder hike present and pain reported by patient. Pain with S/L abduction, had to modify range today. Abduction ROM improves with arm externally rotated and after manual interventions.       Exercises: (No more than 4 columns)   Exercise/Equipment 7/23/20  #18 7/27/20  #19 7/29/2020 #20           WARM UP  rpm 2'/2'  rpm 2'/2' 2'/2'          MANUAL:  As below  x x         TABLE      Cane press up 2# 10x2  2# 10x2  2# 2*10   S/L ER (towel) 1# 10x3  1# 15x2  1# 2*15   S/L abduction  1# slightly ant, 10x2 1# slightly ant, 10x2 1# 2*10   S/L flexion  2# 10x2, modified ROM, towel under to start at 20-30° 2# 10x2, towel to block arm from ext  2# 2*10 supine modified range             STANDING      Taffy pull   GTB 10x2 GTB 2*10   B ext w/ scap squeeze   GTB 10x2 GTB 2*10   Wall push up  - Counter 10x2  Counter 2*10   Ball on wall 4 way 20xea  20xea  20* ea    B abduction like jump tha arm 10x 10x2 2*10              PROPRIOCEPTION                                    MODALITIES CP   10' 10'       Other Therapeutic Activities/Education:     Home Exercise Program:  Issued, practiced and pt demo ability to perform 5/21/2020.  6/3/20: updated HEP    Manual Treatments:   PROM in all planes of motion, grade III distraction, grade II-III inferior and posterior GH joint mobilizations     Modalities:  '         Communication with other providers:  POC sent 5/21/20, See PN 6/25, 7/23    Assessment:  Mabel Muñiz continues to struggle with abduction ROM in particular both passively and actively with increased pain and shoulder hike compensation. Abduction ROM improves significantly however after manual interventions. End session pain: 0-1/10    Plan for Next Session:   Continue joint mobs and advance ROM to karen, pt not much for home exercise so keep HEP min to add important ones. .. Barbara Daigle Work on scap strength and mechanics w/ movement    Time In / Time Out:  4385/1618    Timed Code/Total Treatment Minutes:   48'/63' 1 man 15' 1 NR 12' 1 TE 21'    Next Progress Note due:  See PN 6/25, 7/23    Plan of Care Interventions:  [x] Therapeutic Exercise  [x] Modalities:  [x] Therapeutic Activity     [] Ultrasound  [] Estim  [] Gait Training      [] Cervical Traction [] Lumbar Traction  [x] Neuromuscular Re-education    [x] Cold/hotpack [] Iontophoresis   [x] Instruction in HEP      [x] Vasopneumatic   [] Dry Needling    [x] Manual Therapy               [] Aquatic Therapy              Electronically signed by:  Kelvin Valladares           7/29/2020, 8:29 AM

## 2020-08-11 ENCOUNTER — HOSPITAL ENCOUNTER (OUTPATIENT)
Dept: PHYSICAL THERAPY | Age: 75
Setting detail: THERAPIES SERIES
Discharge: HOME | End: 2020-08-11
Payer: MEDICARE

## 2020-08-11 PROCEDURE — 97110 THERAPEUTIC EXERCISES: CPT

## 2020-08-11 PROCEDURE — 97535 SELF CARE MNGMENT TRAINING: CPT

## 2020-08-11 PROCEDURE — 97140 MANUAL THERAPY 1/> REGIONS: CPT

## 2020-08-11 NOTE — FLOWSHEET NOTE
Outpatient Physical Therapy  [place]           [x] Phone: [phone]   Fax: [phone]  [place]           [] Phone: [phone]   Fax: [phone]        Physical Therapy Daily Treatment Note  Date:  2020    Patient Name:  Jonah Ly    :  1945  MRN: [id number]  Restrictions/Precautions: Other position/activity restrictions: none  Diagnosis:   Diagnosis: R shoulder bursitis  Date of Injury/Surgery:   Treatment Diagnosis: Treatment Diagnosis: R shoulder pain, weakness, inflammation, stiffness    Insurance/Certification information: PT Insurance Information: Matt Pennington $[id number]   Referring Physician:  Referring Practitioner: Dr Laure Irby   Next Doctor Visit:     Plan of care signed (Y/N):  Y, pending PN  Outcome Measure: DASH 7%, : 4.5%, : 2%  Visit# / total visits:    , pending another PN for more Rx again   Pain level: 0-1/10 at rest, 2/10 with abduction        Goals:       Short term goals  Time Frame for Short term goals: 3 weeks  Short term goal 1: Pt will be able to advance ROM work w/o pain  Mostly met  Short term goal 2: Pt will be able to raise arm on own > 120 w/ min to no pain  Met   Short term goal 3: Pt will compliant w/ HEP  Met to date   Long term goals  Time Frame for Long term goals : 6 weeks  Long term goal 1: Pt will be able to manage his symptoms on his own after PT  Partially met  Long term goal 2: Pt will be able to return to prior activity w/o R shoulder pain  2020 pt reports 75-80% there   Long term goal 3: Pt will have full AROM and strength R UE w/o pain  Progressing    Long term goal 4: Pt will decrease DASH score to <5% disability   Progressing    DASH 7%, : 4.5%, : 2%     Summary of Evaluation: Assessment: Pt is a 71 yo male who presents w/ R shoulder pain, bursitis, tendonosis. He has no prior Hx of shoulder pain and was using shoulder/arm w/o pain prior to onset 4 months ago. No know DAWN.   He demonstrates limited AROM and painful; PROM, weakness and limited joint mobility in R shoulder. These limiations are affecting his ability to perform his usual daily actiivity and he will benefit from PT to help restore PLOF w/o pain. Did have Cortizone Feb/March      Subjective:    Had follow up with Dr. Avinash Plascencia and they took a deeper look at the x-ray and he has a bone spur. Wants him to do PT every 2 weeks for the next few weeks and see if this gets him the last 10% of improvement he is looking for, after the first of the year if things are no better then possible surgical intervention. He is interested in trying US to reduce the size of the bone spur. Any changes in Ambulatory Summary Sheet? None    Objective:  Prior to today's treatment session, patient was screened for signs and symptoms related to COVID-19 including but not limited to verbally answering questions related to feeling ill, cough, or SOB, along with taking temperature via forehead thermometer. Patient presented with all negative signs and symptoms and had no fever >100 degrees Fahrenheit this date. Abduction limited with moderate shoulder hike that reduces after a few repetitions and the range improves.        Exercises: (No more than 4 columns)   Exercise/Equipment 7/27/20  #19 7/29/2020 #20 8/11/2020 #21           WARM UP  rpm 2'/2' 2'/2'  2'/2' @ 120         MANUAL:  As below x x x         TABLE      Cane press up 2# 10x2  2# 2*10 -   S/L ER (towel) 1# 15x2  1# 2*15 -   S/L abduction  1# slightly ant, 10x2 1# 2*10 -   S/L flexion  2# 10x2, towel to block arm from ext  2# 2*10 supine modified range  -            STANDING      Taffy pull  GTB 10x2 GTB 2*10 -   B ext w/ scap squeeze  GTB 10x2 GTB 2*10 -   Wall push up  Counter 10x2  Counter 2*10 -   Ball on wall 4 way 20xea  20* ea  -   B abduction like jump tha arm 10x2 2*10 2*10 at mirror to reduce compensation   B scaption    2*10 at mirror to reduce compensation               PROPRIOCEPTION MODALITIES      CP  10' 10' --       Other Therapeutic Activities/Education: Most of the session today was spent discussing options for treatment, importance of different exercises to work together to improve the stability and mechanics of the shoulder joint to avoid impingement. We talked at length about the bony and muscular make up of the shoulder and how the bone spurs affect shoulder movement. Beny Kim was very interested in having a handful of very specific exercises to help improve his abduction so we discussed taffy pull, scaption/adbduction in standing with avoidance of shoulder hike, and self inferior mobilizations. Home Exercise Program:  Issued, practiced and pt demo ability to perform 5/21/2020.  6/3/20: updated HEP    Manual Treatments:   PROM in all planes of motion, grade III distraction, grade II-III inferior and posterior GH joint mobilizations 8'    Modalities:  '         Communication with other providers:  POC sent 5/21/20, See PN 6/25, 7/23    Assessment:  Beny Kim has made a lot of progress since starting PT; however, he continues to have increased pain and limitation with pure abduction. He will continue to benefit from a few more sessions of PT in order to assess full benefit of PT for return to PLOF. End session pain: 0/10    Plan for Next Session:   Continue joint mobs and advance ROM to karen, pt not much for home exercise so keep HEP min to add important ones. .. Evasn Krueger Ra Work on scap strength and mechanics w/ movement    Time In / Time Out:  1430/1520    Timed Code/Total Treatment Minutes: 48' 1 man 8' 1 TE 10' 1 ADL 28'  [address]    Next Progress Note due:  See PN 6/25, 7/23    Plan of Care Interventions:  [x] Therapeutic Exercise  [x] Modalities:  [x] Therapeutic Activity     [] Ultrasound  [] Estim  [] Gait Training      [] Cervical Traction [] Lumbar Traction  [x] Neuromuscular Re-education    [x] Cold/hotpack [] Iontophoresis   [x] Instruction in HEP [x] Vasopneumatic   [] Dry Needling    [x] Manual Therapy               [] Aquatic Therapy              Electronically signed by:  Richi Nielson           8/11/2020, 12:39 PM

## 2020-08-25 ENCOUNTER — HOSPITAL ENCOUNTER (OUTPATIENT)
Dept: PHYSICAL THERAPY | Age: 75
Setting detail: THERAPIES SERIES
Discharge: HOME | End: 2020-08-25
Payer: MEDICARE

## 2020-08-25 PROCEDURE — 97535 SELF CARE MNGMENT TRAINING: CPT

## 2020-08-25 PROCEDURE — 97140 MANUAL THERAPY 1/> REGIONS: CPT

## 2020-08-25 PROCEDURE — 97112 NEUROMUSCULAR REEDUCATION: CPT

## 2020-08-25 NOTE — FLOWSHEET NOTE
Outpatient Physical Therapy  [place]           [x] Phone: [phone]   Fax: [phone]  [place]           [] Phone: [phone]   Fax: [phone]        Physical Therapy Daily Treatment Note  Date:  2020    Patient Name:  Ariella Weaver    :  1945  MRN: [id number]  Restrictions/Precautions: Other position/activity restrictions: none  Diagnosis:   Diagnosis: R shoulder bursitis  Date of Injury/Surgery:   Treatment Diagnosis: Treatment Diagnosis: R shoulder pain, weakness, inflammation, stiffness    Insurance/Certification information: PT Insurance Information: Matt Polanco $[id number]   Referring Physician:  Referring Practitioner: Dr Manuel Moncada   Next Doctor Visit:     Plan of care signed (Y/N):  Y, pending PN  Outcome Measure: DASH 7%, : 4.5%, : 2%  Visit# / total visits:    , pending another PN for more Rx again   Pain level: 0-1/10 at rest, 2/10 with abduction        Goals:       Short term goals  Time Frame for Short term goals: 3 weeks  Short term goal 1: Pt will be able to advance ROM work w/o pain  Mostly met  Short term goal 2: Pt will be able to raise arm on own > 120 w/ min to no pain  Met   Short term goal 3: Pt will compliant w/ HEP  Met to date   Long term goals  Time Frame for Long term goals : 6 weeks  Long term goal 1: Pt will be able to manage his symptoms on his own after PT  Partially met  Long term goal 2: Pt will be able to return to prior activity w/o R shoulder pain  2020 pt reports 75-80% there   Long term goal 3: Pt will have full AROM and strength R UE w/o pain  Progressing    Long term goal 4: Pt will decrease DASH score to <5% disability   Progressing    DASH 7%, : 4.5%, : 2%     Summary of Evaluation: Assessment: Pt is a 69 yo male who presents w/ R shoulder pain, bursitis, tendonosis. He has no prior Hx of shoulder pain and was using shoulder/arm w/o pain prior to onset 4 months ago. No know DAWN.   He demonstrates limited AROM and painful; PROM, weakness and limited joint mobility in R shoulder. These limiations are affecting his ability to perform his usual daily actiivity and he will benefit from PT to help restore PLOF w/o pain. Did have Cortizone Feb/March      Subjective:   [place] Garcia arrives to therapy stating that the shoulder is feeling pretty good. Still has some pain with abduction but after 3 or so repetitions he is able to get it up to the same level as the opposite side with min pain. Any changes in Ambulatory Summary Sheet? None    Objective:  Prior to today's treatment session, patient was screened for signs and symptoms related to COVID-19 including but not limited to verbally answering questions related to feeling ill, cough, or SOB, along with taking temperature via forehead thermometer. Patient presented with all negative signs and symptoms and had no fever >100 degrees Fahrenheit this date. Mild shoulder hike with abduction and decreased ROM; however, this improves after 3-4 repetitions.        Exercises: (No more than 4 columns)   Exercise/Equipment 7/29/2020 #20 8/11/2020 #21 8/25/2020 #22           WARM UP 2'/2'  2'/2' @ 120 --         MANUAL:  As below x x x         TABLE      Cane press up 2# 2*10 - -   S/L ER (towel) 1# 2*15 - -   S/L abduction  1# 2*10 - -   S/L flexion  2# 2*10 supine modified range  - -            STANDING      Taffy pull  GTB 2*10 -    B ext w/ scap squeeze  GTB 2*10 -    Wall push up  Counter 2*10 -    Ball on wall 4 way 20* ea  -    B abduction like jump tha arm 2*10 2*10 at mirror to reduce compensation    B scaption   2*10 at mirror to reduce compensation     D1 extension    GTB 2*10   Push up plus   10*                    PROPRIOCEPTION                                    MODALITIES      CP  10' --        Other Therapeutic Activities/Education: Education once again provided giving detailed information on the purpose of exercises and the specific areas that we should be targeting in order to prevent impingement. Discussed home management and gave education on exercise details such as reps/weights/progression etc.     Home Exercise Program:  Issued, practiced and pt demo ability to perform 5/21/2020.  6/3/20: updated HEP    Manual Treatments:   PROM in all planes of motion, grade III distraction, grade II-III inferior and posterior GH joint mobilizations 10'    Modalities:  '         Communication with other providers:  POC sent 5/21/20, See PN 6/25, 7/23    Assessment:  Pelon Ross continues to show progress with pain and ability to elevate the arm, especially into abduction. We were able to achieve full ER with the shoulder abducted to 90° passively this date without reports of pain. He will likely continue to progress on his own with a specific set of exercises for improved scapular mechanics and improved RTC strength.  End session pain: 0/10    Plan for Next Session: D/C    Time In / Time Out: 1552/1630    Timed Code/Total Treatment Minutes:  45' 1 man 10' 1 NR 10' 1 ADL 18' [address]    Next Progress Note due:  See PN 6/25, 7/23    Plan of Care Interventions:  [x] Therapeutic Exercise  [x] Modalities:  [x] Therapeutic Activity     [] Ultrasound  [] Estim  [] Gait Training      [] Cervical Traction [] Lumbar Traction  [x] Neuromuscular Re-education    [x] Cold/hotpack [] Iontophoresis   [x] Instruction in HEP      [x] Vasopneumatic   [] Dry Needling    [x] Manual Therapy               [] Aquatic Therapy              Electronically signed by:  Butch Roche           8/25/2020, 8:10 AM

## 2020-09-08 ENCOUNTER — HOSPITAL ENCOUNTER (OUTPATIENT)
Dept: PHYSICAL THERAPY | Age: 75
Setting detail: THERAPIES SERIES
Discharge: HOME | End: 2020-09-08
Payer: MEDICARE

## 2020-09-08 PROCEDURE — 97535 SELF CARE MNGMENT TRAINING: CPT

## 2020-09-08 PROCEDURE — 97112 NEUROMUSCULAR REEDUCATION: CPT

## 2020-09-08 PROCEDURE — 97140 MANUAL THERAPY 1/> REGIONS: CPT

## 2020-09-08 NOTE — FLOWSHEET NOTE
Outpatient Physical Therapy  [place]           [x] Phone: [phone]   Fax: [phone]  [place]           [] Phone: [phone]   Fax: [phone]        Physical Therapy Daily Treatment Note  Date:  2020    Patient Name:  Carl Jovel    :  1945  MRN: [id number]  Restrictions/Precautions: Other position/activity restrictions: none  Diagnosis:   Diagnosis: R shoulder bursitis  Date of Injury/Surgery:   Treatment Diagnosis: Treatment Diagnosis: R shoulder pain, weakness, inflammation, stiffness    Insurance/Certification information: PT Insurance Information: [facility] $[id number]   Referring Physician:  Referring Practitioner: Dr Juan Luis Feliz   Next Doctor Visit:     Plan of care signed (Y/N):  Y, pending PN  Outcome Measure: DASH 7%, : 4.5%, : 2%  Visit# / total visits:        Pain level:  0/10 at rest,  1/10 with abduction        Goals:       Short term goals  Time Frame for Short term goals: 3 weeks  Short term goal 1: Pt will be able to advance ROM work w/o pain  Met  Short term goal 2: Pt will be able to raise arm on own > 120 w/ min to no pain  Met   Short term goal 3: Pt will compliant w/ HEP  Met     Long term goals  Time Frame for Long term goals : 6 weeks  Long term goal 1: Pt will be able to manage his symptoms on his own after PT  Met   Long term goal 2: Pt will be able to return to prior activity w/o R shoulder pain  Met   Long term goal 3: Pt will have full AROM and strength R UE w/o pain  Mostly met     Long term goal 4: Pt will decrease DASH score to <5% disability  Met   DASH 7%, : 4.5%, : 2%, : 0%    Summary of Evaluation: Assessment: Pt is a 71 yo male who presents w/ R shoulder pain, bursitis, tendonosis. He has no prior Hx of shoulder pain and was using shoulder/arm w/o pain prior to onset 4 months ago. No know DAWN. He demonstrates limited AROM and painful; PROM, weakness and limited joint mobility in R shoulder.   These limiations are affecting his ability to perform his usual daily actiivity and he will benefit from PT to help restore PLOF w/o pain. Did have Cortizone Feb/March      Subjective:     Pt reports that he is 98% better. Has a pretty good routine at home but is concerned about a flare up. Any changes in Ambulatory Summary Sheet? None    Objective:  Prior to today's treatment session, patient was screened for signs and symptoms related to COVID-19 including but not limited to verbally answering questions related to feeling ill, cough, or SOB, along with taking temperature via forehead thermometer. Patient presented with all negative signs and symptoms and had no fever >100 degrees Fahrenheit this date. AROM R shoulder:    flex 128°  abd 140°  IR and ER reach are [facility] w/o pain. MMT: R shoulder  Flex 4+/5 w/ mild pain  Abd, bicep and IR 5/5  ER 4+/5 w/ mild pain     Still some shoulder hike w/ Abd if does palms down. We spent a good deal of time talking thru the do's and don'ts of his HEP, why not to do abd palm down if shoulder is hiking up, which ones to do and how often now etc.  Pt confused about resistance thinking his body fighting the movement pattern was similar to using a weight. We also talked about what happens next and tried to calm his fears of working on his own but he felt better knowing we could hold 30 days and he could call in that time prn.           Exercises: (No more than 4 columns)   Exercise/Equipment 7/29/2020 #20 8/11/2020 #21 8/25/2020 #22 9/8/20  #23            WARM UP 2'/2'  2'/2' @ 120 -- 2'/2' @ 100          MANUAL:  As below x x x x          TABLE       Cane press up 2# 2*10 - - -   S/L ER (towel) 1# 2*15 - - -   S/L abduction  1# 2*10 - - -   S/L flexion  2# 2*10 supine modified range  - - -             STANDING    Reviewed    Taffy pull  GTB 2*10 -  -   B ext w/ scap squeeze  GTB 2*10 -  -   Wall push up  Counter 2*10 -  -   Ball on wall 4 way 20* ea  -  -   B abduction like jump tha arm 2*10 2*10 at mirror to reduce compensation  Reviewed in mirror    B scaption   2*10 at mirror to reduce compensation   -   D1 extension    GTB 2*10 Reviewed, doorknob ok   Push up plus   10* Practice in quadruped, cues to avoid shrug                      PROPRIOCEPTION                                          MODALITIES       CP  10' --         Other Therapeutic Activities/Education: Education once again provided giving detailed information on the purpose of exercises and the specific areas that we should be targeting in order to prevent impingement. Discussed home management and gave education on exercise details such as reps/weights/progression etc.     Home Exercise Program:  Issued, practiced and pt demo ability to perform 5/21/2020.  6/3/20: updated HEP    Manual Treatments:   scap mobs, PROM in all planes of motion, grade III distraction, grade II-III inferior and posterior GH joint mobilizations 10'    Modalities:  '         Communication with other providers:  POC sent 5/21/20, See PN 6/25, 7/23, 9/8/20    Assessment:  Mabel Muñiz continues to show progress with pain and ability to elevate the arm, especially into abduction. He still has some limited ROM and some altered mechanics but is doing very well w/ his HEP at this time. He has also met his goals at this time. Pt should be ok to continue on his own, but is fearful of flare up, so we agreed to hold 30 days and if still doing ok then we will formally dc.   End session pain: 0/10    Plan for Next Session:  Hold 30 days    Time In / Time Out:  1445/1530  Timed Code/Total Treatment Minutes:  38/39  1 man 15' 1 NR 10' 1 ADL 20' KX MODIFIER    Next Progress Note due:  See PN 6/25, 7/23, 9/8/20    Plan of Care Interventions:  [x] Therapeutic Exercise  [x] Modalities:  [x] Therapeutic Activity     [] Ultrasound  [] Estim  [] Gait Training      [] Cervical Traction [] Lumbar Traction  [x] Neuromuscular Re-education    [x] Cold/hotpack [] Iontophoresis   [x] Instruction in HEP      [x] Vasopneumatic   [] Dry Needling    [x] Manual Therapy               [] Aquatic Therapy              Electronically signed by:  Lilliam Lyman  PT, MPT, ATC       9/8/2020, 4:41 PM

## 2020-09-08 NOTE — PROGRESS NOTES
Outpatient Physical Therapy           [place]           [x] Phone: [phone]   Fax: [phone]  [place]           [] Phone: [phone]   Fax: [phone]      To: Shayna Dan       From: Carlee Mark PT     Patient: Srikanth Whitlock                  : 1945  Diagnosis:  R shoulder bursitis    Date: 2020  Treatment Diagnosis: R shoulder pain, weakness, inflammation, stiffness      [x]  Progress Note                [x]  Discharge Note    Evaluation Date:  2020  Total Visits to date:   23 Cancels/No-shows to date:  0    Subjective:   Pt reports that he is 98% better. Has a pretty good routine at home but is concerned about a flare up. No pain at rest and mild discomfort w/ abduction ROM 0-1/10. Plan of Care/Treatment to date:  [x] Therapeutic Exercise    [x] Modalities:  [x] Therapeutic Activity     [] Ultrasound  [] Electrical Stimulation  [] Gait Training      [] Cervical Traction   [] Lumbar Traction  [x] Neuromuscular Re-education  [x] Cold/hotpack [] Iontophoresis  [x] Instruction in HEP      Other:  [x] Manual Therapy       [x]  Vasopneumatic  [] Aquatic Therapy       []                          Objective/Significant Findings At Last Visit/Comments:  Prior to today's treatment session, patient was screened for signs and symptoms related to COVID-19 including but not limited to verbally answering questions related to feeling ill, cough, or SOB, along with taking temperature via forehead thermometer. Patient presented with all negative signs and symptoms and had no fever >100 degrees Fahrenheit this date.      AROM R shoulder:    flex 128°  abd 140°  IR and ER reach are [facility] w/o pain.      MMT: R shoulder  Flex 4+/5 w/ mild pain  Abd, bicep and IR 5/5  ER 4+/5 w/ mild pain      Still some shoulder hike w/ Abd if does palms down.       We spent a good deal of time talking thru the do's and don'ts of his HEP, why not to do abd palm down if shoulder is hiking up, which ones to do and how often now etc.  Pt confused about resistance thinking his body fighting the movement pattern was similar to using a weight. We also talked about what happens next and tried to calm his fears of working on his own but he felt better knowing we could hold 30 days and he could call in that time prn. Assessment:   Taryn Corrigan continues to show progress with pain and ability to elevate the arm, especially into abduction. He still has some limited ROM and some altered mechanics but is doing very well w/ his HEP at this time. He has also met his goals at this time. Pt should be ok to continue on his own, but is fearful of flare up, so we agreed to hold 30 days and if still doing ok then we will formally dc.   End session pain: 0/10         Goal Status:  [] Achieved [x] Partially Achieved  [] Not Achieved   Short term goals  Time Frame for Short term goals: 3 weeks  Short term goal 1: Pt will be able to advance ROM work w/o pain  Met  Short term goal 2: Pt will be able to raise arm on own > 120 w/ min to no pain  Met   Short term goal 3: Pt will compliant w/ HEP  Met     Long term goals  Time Frame for Long term goals : 6 weeks  Long term goal 1: Pt will be able to manage his symptoms on his own after PT  Met   Long term goal 2: Pt will be able to return to prior activity w/o R shoulder pain  Met   Long term goal 3: Pt will have full AROM and strength R UE w/o pain  Mostly met     Long term goal 4: Pt will decrease DASH score to <5% disability  Met  DASH 7%, 6/25: 4.5%, 7/23: 2%, 9/8: 0%      Frequency/Duration:  # Days per week: [] 1 day # Weeks: [] 1 week [] 4 weeks [] 8 weeks     [x] 2 days   [] 2 weeks [] 5 weeks [] 10 weeks     [] 3 days   [] 3 weeks [x] 6 weeks [] 12 weeks       Rehab Potential: [] Excellent [x] Good [] Fair  [] Poor         Patient Status: [] Continue per initial plan of Care     [x] Patient on 30 day hold and if no word will discharge     [] Additional visits requested, Please re-certify for additional visits:      Requested frequency/duration:  /week for  weeks    If we are requesting more visits, we fully anticipate the patient's condition is expected to improve within the treatment timeframe we are requesting. Electronically signed by:  Krzysztof Oliveira PT, MPT, ATC  9/8/2020, 9:25 AM     9/8/2020, 4:44 PM   If you have any questions or concerns, please don't hesitate to call.   Thank you for your referral.    Physician Signature:______________________ Date:______ Time: ________  By signing above, therapists plan is approved by physician

## 2021-09-29 ENCOUNTER — OFFICE VISIT (OUTPATIENT)
Dept: PHYSICAL MEDICINE AND REHAB | Age: 76
End: 2021-09-29
Payer: MEDICARE

## 2021-09-29 VITALS — TEMPERATURE: 97.3 F

## 2021-09-29 DIAGNOSIS — R20.2 PARESTHESIA OF BILATERAL LEGS: ICD-10-CM

## 2021-09-29 DIAGNOSIS — M54.17 LUMBOSACRAL RADICULOPATHY AT S1: Primary | ICD-10-CM

## 2021-09-29 DIAGNOSIS — M79.604 PAIN OF RIGHT LOWER EXTREMITY: ICD-10-CM

## 2021-09-29 PROCEDURE — 95886 MUSC TEST DONE W/N TEST COMP: CPT | Performed by: PHYSICAL MEDICINE & REHABILITATION

## 2021-09-29 PROCEDURE — 95911 NRV CNDJ TEST 9-10 STUDIES: CPT | Performed by: PHYSICAL MEDICINE & REHABILITATION

## 2021-09-29 NOTE — PROGRESS NOTES
EMG REPORT     CHIEF COMPLAINT: Right anterior lower leg pain. HISTORY OF PRESENT ILLNESS: 78 y.o. right hand dominant male with persistent (slightly progressive) painful sensation in the right anterior lower leg and ankle. Symptoms seem to spread into the foot to involve the middle toes of the right foot. He has not noted any rashes, swelling or other discoloration of the involved areas. He does not routinely have back pain. He rated his leg pain severity as 2/10. The symptoms do not impact his sleep. He does not seem to stumble when fatigued. He did not report any cramping. He has no history of diabetes or any thyroid disorder. PHYSICAL EXAMINATION: Alert. Decreased lumbar lordosis. No paraspinal muscle tenderness to light palpation. Normal hip and knee passive range of motion. Negative straight leg raising. 2+ and symmetric knee jerks. Trace and symmetric ankle jerks. 4+/5 right EDB MMT. No other weakness was noted. There was no atrophy, tremor or clonus present. Perception of touch was distorted over the dorsum of the right foot. NERVE CONDUCTION STUDIES:     MOTOR         LATENCY NORMAL AMPLITUDE DISTANCE COND. MIKE.    R  PERONEAL   4.6 < 6.2 msec 3.7/4.1 8 cm 42/62   L  PERONEAL  < 6.2 msec  8 cm    RIGHT  TIBIAL 4.9 < 6.2 msec 7.3 8 cm 57   LEFT TIBIAL  < 6.2 msec  8 cm    R TIB H REFLEX 34.7 < 50 msec      L TIB H REFLEX 35.5 < 50 msec         SENSORY  ANTIDROMIC        LATENCY NORMAL AMPLITUDE DISTANCE   R SUP PERONEAL 2.9 < 3.6 msec 7 10 cm   L SUP PERONEAL  < 3.6 msec  10 cm   RIGHT  SURAL 4.0 < 4.0 msec 9 14 cm   LEFT  SURAL 3.8 < 4.0 msec 11 14 cm         NEEDLE EMG:      RIGHT   LEFT     Insertional Activity Spontaneous  Activity Volutional  MUAP's Insertional Activity Spontaneous  Activity Volutional  MUAP's   Lumbar paraspinals Increased + Polys and CRD's Normal None Normal   Glut Med Normal None Normal Normal None Normal   Rect fem Normal None Normal Normal None Normal Vast Med Normal None Normal Normal None Normal   Ant Tibialis Normal None Normal Normal None Normal   EHL Normal None Polys Normal None Normal   FDL Increased + Dec #, Polys Normal None Normal   Gastroc Increased Occ Polys Normal None Normal   EDB \" + Dec #, Larger Normal None Normal   1st dors int \" Occ Polys Normal None Normal       FINDINGS:   EMG of the lumbar paraspinals and both lower limbs demonstrated mild paraspinal and limb muscle membrane irritability on the right. A few positive waves and fibrillations were identified within the right S1 myotome musculature. In those irritable muscles, motor units were mildly reduced in number and either polyphasic or larger. Sensory and motor latencies, evoked amplitudes and conduction velocities were within normal limits. Tibial H reflexes were reasonably symmetric. IMPRESSION:      1. Mildly abnormal EMG. There is subtle, but distinct, evidence of a right S1 spinal nerve root injury (right S1 radiculopathy). Correlation with lumbar imaging may help explain this finding. 2.  No evidence of a concurrent lumbar plexopathy, focal peripheral nerve entrapment syndrome, generalized neuropathy or primary muscle disease.           Thank you for this interesting referral.

## 2021-09-29 NOTE — LETTER
September 29, 2021        Linda Moeller MD  [address], [address]        Patient Name: Lisa Preston   MRN Number: [id number]   YOB: 1945   Date Of Visit: 09/29/2021        Dear Linda Moeller MD,        Thank you for referring Lisa Preston to me for electrodiagnostic testing. Attached are the findings of the EMG and my assessment. If you have any further questions, please do not hesitate to call me. Thank you for this interesting referral.      Sincerely,          CARLOS Li MD.